# Patient Record
Sex: FEMALE | Race: WHITE | NOT HISPANIC OR LATINO | Employment: FULL TIME | URBAN - METROPOLITAN AREA
[De-identification: names, ages, dates, MRNs, and addresses within clinical notes are randomized per-mention and may not be internally consistent; named-entity substitution may affect disease eponyms.]

---

## 2017-02-10 ENCOUNTER — HOSPITAL ENCOUNTER (OUTPATIENT)
Dept: LAB | Facility: HOSPITAL | Age: 37
Setting detail: SPECIMEN
Discharge: HOME | End: 2017-02-10
Attending: INTERNAL MEDICINE | Admitting: INTERNAL MEDICINE

## 2017-02-10 LAB
ALBUMIN SERPL-MCNC: 3.5 G/DL (ref 3.5–4.8)
ALBUMIN/GLOB SERPL: 1.1 {RATIO} (ref 1–1.7)
ALP SERPL-CCNC: 40 IU/L (ref 32–91)
ALT SERPL-CCNC: 17 IU/L (ref 14–54)
ANION GAP SERPL CALC-SCNC: 10.2 MMOL/L (ref 10–20)
AST SERPL-CCNC: 16 IU/L (ref 15–41)
BILIRUB SERPL-MCNC: 1.3 MG/DL (ref 0.3–1.2)
BUN SERPL-MCNC: 8 MG/DL (ref 8–20)
BUN/CREAT SERPL: 10 (ref 5.4–26.2)
CALCIUM SERPL-MCNC: 9.1 MG/DL (ref 8.9–10.3)
CHLORIDE SERPL-SCNC: 106 MMOL/L (ref 101–111)
CONV CO2: 28 MMOL/L (ref 22–32)
CONV TOTAL PROTEIN: 6.8 G/DL (ref 6.1–7.9)
CREAT UR-MCNC: 0.8 MG/DL (ref 0.4–1)
GLOBULIN UR ELPH-MCNC: 3.3 G/DL (ref 2.5–3.8)
GLUCOSE SERPL-MCNC: 83 MG/DL (ref 65–99)
POTASSIUM SERPL-SCNC: 4.2 MMOL/L (ref 3.6–5.1)
SODIUM SERPL-SCNC: 140 MMOL/L (ref 136–144)
TSH SERPL-ACNC: 3.1 UIU/ML (ref 0.34–5.6)

## 2017-05-19 ENCOUNTER — HOSPITAL ENCOUNTER (OUTPATIENT)
Dept: LAB | Facility: HOSPITAL | Age: 37
Setting detail: SPECIMEN
Discharge: HOME | End: 2017-05-19
Attending: INTERNAL MEDICINE | Admitting: INTERNAL MEDICINE

## 2017-05-19 LAB
ALBUMIN SERPL-MCNC: 3.6 G/DL (ref 3.5–4.8)
ALBUMIN/GLOB SERPL: 1.1 {RATIO} (ref 1–1.7)
ALP SERPL-CCNC: 42 IU/L (ref 32–91)
ALT SERPL-CCNC: 20 IU/L (ref 14–54)
ANION GAP SERPL CALC-SCNC: 12.2 MMOL/L (ref 10–20)
AST SERPL-CCNC: 16 IU/L (ref 15–41)
BILIRUB SERPL-MCNC: 1.3 MG/DL (ref 0.3–1.2)
BUN SERPL-MCNC: 7 MG/DL (ref 8–20)
BUN/CREAT SERPL: 7.8 (ref 5.4–26.2)
CALCIUM SERPL-MCNC: 9 MG/DL (ref 8.9–10.3)
CHLORIDE SERPL-SCNC: 108 MMOL/L (ref 101–111)
CONV CO2: 24 MMOL/L (ref 22–32)
CONV TOTAL PROTEIN: 6.8 G/DL (ref 6.1–7.9)
CREAT UR-MCNC: 0.9 MG/DL (ref 0.4–1)
GLOBULIN UR ELPH-MCNC: 3.2 G/DL (ref 2.5–3.8)
GLUCOSE SERPL-MCNC: 81 MG/DL (ref 65–99)
POTASSIUM SERPL-SCNC: 4.2 MMOL/L (ref 3.6–5.1)
SODIUM SERPL-SCNC: 140 MMOL/L (ref 136–144)
T4 FREE SERPL-MCNC: 1.08 NG/DL (ref 0.58–1.64)
TSH SERPL-ACNC: 2.09 UIU/ML (ref 0.34–5.6)

## 2017-07-17 ENCOUNTER — HOSPITAL ENCOUNTER (OUTPATIENT)
Dept: LAB | Facility: HOSPITAL | Age: 37
Setting detail: SPECIMEN
Discharge: HOME | End: 2017-07-17
Attending: ORTHOPAEDIC SURGERY | Admitting: ORTHOPAEDIC SURGERY

## 2017-07-17 LAB
CONCENTRATED SMEAR: (no result)
CONV GRANULOCYTES, FLUID: 68 %
CONV MONOCYTES, FLUID: 28 %
CRYSTALS FLD MICRO: NORMAL
LYMPHOCYTES NFR FLD MANUAL: 4 %
SPECIMEN SOURCE: (no result)
SPECIMEN SOURCE: NORMAL
WBC # FLD MANUAL: (no result) {CELLS}/UL

## 2017-11-10 ENCOUNTER — HOSPITAL ENCOUNTER (OUTPATIENT)
Dept: LAB | Facility: HOSPITAL | Age: 37
Setting detail: SPECIMEN
Discharge: HOME | End: 2017-11-10
Attending: INTERNAL MEDICINE | Admitting: INTERNAL MEDICINE

## 2017-11-10 LAB
ALBUMIN SERPL-MCNC: 3.5 G/DL (ref 3.5–4.8)
ALBUMIN/GLOB SERPL: 1.1 {RATIO} (ref 1–1.7)
ALP SERPL-CCNC: 45 IU/L (ref 32–91)
ALT SERPL-CCNC: 14 IU/L (ref 14–54)
ANION GAP SERPL CALC-SCNC: 12.4 MMOL/L (ref 10–20)
AST SERPL-CCNC: 16 IU/L (ref 15–41)
BILIRUB SERPL-MCNC: 1.7 MG/DL (ref 0.3–1.2)
BUN SERPL-MCNC: 7 MG/DL (ref 8–20)
BUN/CREAT SERPL: 8.8 (ref 5.4–26.2)
CALCIUM SERPL-MCNC: 8.9 MG/DL (ref 8.9–10.3)
CHLORIDE SERPL-SCNC: 106 MMOL/L (ref 101–111)
CONV CO2: 24 MMOL/L (ref 22–32)
CONV TOTAL PROTEIN: 6.7 G/DL (ref 6.1–7.9)
CREAT UR-MCNC: 0.8 MG/DL (ref 0.4–1)
GLOBULIN UR ELPH-MCNC: 3.2 G/DL (ref 2.5–3.8)
GLUCOSE SERPL-MCNC: 88 MG/DL (ref 65–99)
POTASSIUM SERPL-SCNC: 4.4 MMOL/L (ref 3.6–5.1)
SODIUM SERPL-SCNC: 138 MMOL/L (ref 136–144)
T4 FREE SERPL-MCNC: 0.99 NG/DL (ref 0.58–1.64)
TSH SERPL-ACNC: 3.02 UIU/ML (ref 0.34–5.6)

## 2018-02-09 ENCOUNTER — HOSPITAL ENCOUNTER (OUTPATIENT)
Dept: LAB | Facility: HOSPITAL | Age: 38
Setting detail: SPECIMEN
Discharge: HOME | End: 2018-02-09
Attending: INTERNAL MEDICINE | Admitting: INTERNAL MEDICINE

## 2018-02-09 LAB
ALBUMIN SERPL-MCNC: 3.3 G/DL (ref 3.5–4.8)
ALBUMIN/GLOB SERPL: 0.8 {RATIO} (ref 1–1.7)
ALP SERPL-CCNC: 53 IU/L (ref 32–91)
ALT SERPL-CCNC: 22 IU/L (ref 14–54)
ANION GAP SERPL CALC-SCNC: 12.2 MMOL/L (ref 10–20)
AST SERPL-CCNC: 18 IU/L (ref 15–41)
BILIRUB SERPL-MCNC: 1 MG/DL (ref 0.3–1.2)
BUN SERPL-MCNC: 6 MG/DL (ref 8–20)
BUN/CREAT SERPL: 7.5 (ref 5.4–26.2)
CALCIUM SERPL-MCNC: 8.9 MG/DL (ref 8.9–10.3)
CHLORIDE SERPL-SCNC: 104 MMOL/L (ref 101–111)
CONV CO2: 26 MMOL/L (ref 22–32)
CONV TOTAL PROTEIN: 7.3 G/DL (ref 6.1–7.9)
CREAT UR-MCNC: 0.8 MG/DL (ref 0.4–1)
GLOBULIN UR ELPH-MCNC: 4 G/DL (ref 2.5–3.8)
GLUCOSE SERPL-MCNC: 89 MG/DL (ref 65–99)
POTASSIUM SERPL-SCNC: 4.2 MMOL/L (ref 3.6–5.1)
SODIUM SERPL-SCNC: 138 MMOL/L (ref 136–144)

## 2018-08-10 ENCOUNTER — HOSPITAL ENCOUNTER (OUTPATIENT)
Dept: LAB | Facility: HOSPITAL | Age: 38
Setting detail: SPECIMEN
Discharge: HOME | End: 2018-08-10
Attending: INTERNAL MEDICINE | Admitting: INTERNAL MEDICINE

## 2018-08-10 LAB
ALBUMIN SERPL-MCNC: 3.6 G/DL (ref 3.5–4.8)
ALBUMIN/GLOB SERPL: 1.1 {RATIO} (ref 1–1.7)
ALP SERPL-CCNC: 41 IU/L (ref 32–91)
ALT SERPL-CCNC: 13 IU/L (ref 14–54)
ANION GAP SERPL CALC-SCNC: 10.1 MMOL/L (ref 10–20)
AST SERPL-CCNC: 13 IU/L (ref 15–41)
BILIRUB SERPL-MCNC: 1.8 MG/DL (ref 0.3–1.2)
BUN SERPL-MCNC: 9 MG/DL (ref 8–20)
BUN/CREAT SERPL: 10 (ref 5.4–26.2)
CALCIUM SERPL-MCNC: 8.9 MG/DL (ref 8.9–10.3)
CHLORIDE SERPL-SCNC: 108 MMOL/L (ref 101–111)
CONV CO2: 26 MMOL/L (ref 22–32)
CONV TOTAL PROTEIN: 6.8 G/DL (ref 6.1–7.9)
CREAT UR-MCNC: 0.9 MG/DL (ref 0.4–1)
GLOBULIN UR ELPH-MCNC: 3.2 G/DL (ref 2.5–3.8)
GLUCOSE SERPL-MCNC: 101 MG/DL (ref 65–99)
POTASSIUM SERPL-SCNC: 4.1 MMOL/L (ref 3.6–5.1)
SODIUM SERPL-SCNC: 140 MMOL/L (ref 136–144)

## 2018-10-15 ENCOUNTER — HOSPITAL ENCOUNTER (OUTPATIENT)
Dept: LAB | Facility: HOSPITAL | Age: 38
Setting detail: SPECIMEN
Discharge: HOME | End: 2018-10-15
Attending: ORTHOPAEDIC SURGERY | Admitting: ORTHOPAEDIC SURGERY

## 2018-10-15 LAB
CRYSTALS FLD MICRO: NORMAL
FLUID TYPE (REF): NORMAL

## 2019-02-08 ENCOUNTER — HOSPITAL ENCOUNTER (OUTPATIENT)
Dept: LAB | Facility: HOSPITAL | Age: 39
Setting detail: SPECIMEN
Discharge: HOME | End: 2019-02-08
Attending: INTERNAL MEDICINE | Admitting: INTERNAL MEDICINE

## 2019-02-08 LAB
ALBUMIN SERPL-MCNC: 3.6 G/DL (ref 3.5–4.8)
ALBUMIN/GLOB SERPL: 1.2 {RATIO} (ref 1–1.7)
ALP SERPL-CCNC: 44 IU/L (ref 32–91)
ALT SERPL-CCNC: 16 IU/L (ref 14–54)
ANION GAP SERPL CALC-SCNC: 12.9 MMOL/L (ref 10–20)
AST SERPL-CCNC: 15 IU/L (ref 15–41)
BILIRUB SERPL-MCNC: 1.2 MG/DL (ref 0.3–1.2)
BUN SERPL-MCNC: 6 MG/DL (ref 8–20)
BUN/CREAT SERPL: 7.5 (ref 5.4–26.2)
CALCIUM SERPL-MCNC: 8.8 MG/DL (ref 8.9–10.3)
CHLORIDE SERPL-SCNC: 107 MMOL/L (ref 101–111)
CONV CO2: 23 MMOL/L (ref 22–32)
CONV TOTAL PROTEIN: 6.6 G/DL (ref 6.1–7.9)
CREAT UR-MCNC: 0.8 MG/DL (ref 0.4–1)
GLOBULIN UR ELPH-MCNC: 3 G/DL (ref 2.5–3.8)
GLUCOSE SERPL-MCNC: 88 MG/DL (ref 65–99)
POTASSIUM SERPL-SCNC: 3.9 MMOL/L (ref 3.6–5.1)
SODIUM SERPL-SCNC: 139 MMOL/L (ref 136–144)

## 2019-02-14 ENCOUNTER — HOSPITAL ENCOUNTER (OUTPATIENT)
Dept: ORTHOPEDIC SURGERY | Facility: CLINIC | Age: 39
Discharge: HOME | End: 2019-02-14
Attending: PODIATRIST | Admitting: PODIATRIST

## 2019-03-01 ENCOUNTER — HOSPITAL ENCOUNTER (OUTPATIENT)
Dept: PHYSICAL THERAPY | Facility: HOSPITAL | Age: 39
Setting detail: RECURRING SERIES
Discharge: HOME | End: 2019-04-04
Attending: PODIATRIST | Admitting: PODIATRIST

## 2019-06-24 ENCOUNTER — OFFICE VISIT (OUTPATIENT)
Dept: FAMILY MEDICINE CLINIC | Facility: CLINIC | Age: 39
End: 2019-06-24

## 2019-06-24 VITALS
WEIGHT: 276 LBS | HEIGHT: 65 IN | RESPIRATION RATE: 16 BRPM | SYSTOLIC BLOOD PRESSURE: 128 MMHG | BODY MASS INDEX: 45.98 KG/M2 | TEMPERATURE: 99 F | DIASTOLIC BLOOD PRESSURE: 88 MMHG | HEART RATE: 98 BPM

## 2019-06-24 DIAGNOSIS — J02.9 ACUTE PHARYNGITIS, UNSPECIFIED ETIOLOGY: Primary | ICD-10-CM

## 2019-06-24 PROCEDURE — 99213 OFFICE O/P EST LOW 20 MIN: CPT | Performed by: FAMILY MEDICINE

## 2019-06-24 RX ORDER — CEPHALEXIN 500 MG/1
500 CAPSULE ORAL 3 TIMES DAILY
Qty: 30 CAPSULE | Refills: 0 | Status: SHIPPED | OUTPATIENT
Start: 2019-06-24 | End: 2019-08-23 | Stop reason: ALTCHOICE

## 2019-06-24 RX ORDER — LEVOTHYROXINE SODIUM 0.05 MG/1
1 TABLET ORAL EVERY 24 HOURS
COMMUNITY
Start: 2018-10-11 | End: 2019-08-23 | Stop reason: SDUPTHER

## 2019-06-24 RX ORDER — LOSARTAN POTASSIUM 25 MG/1
1 TABLET ORAL EVERY 24 HOURS
COMMUNITY
Start: 2018-06-26 | End: 2020-02-25 | Stop reason: SDUPTHER

## 2019-06-24 NOTE — PATIENT INSTRUCTIONS
"Upper Respiratory Infection, Adult  An upper respiratory infection (URI) affects the nose, throat, and upper air passages. URIs are caused by germs (viruses). The most common type of URI is often called \"the common cold.\"  Medicines cannot cure URIs, but you can do things at home to relieve your symptoms. URIs usually get better within 7-10 days.  Follow these instructions at home:  Activity  · Rest as needed.  · If you have a fever, stay home from work or school until your fever is gone, or until your doctor says you may return to work or school.  ? You should stay home until you cannot spread the infection anymore (you are not contagious).  ? Your doctor may have you wear a face mask so you have less risk of spreading the infection.  Relieving symptoms  · Gargle with a salt-water mixture 3-4 times a day or as needed. To make a salt-water mixture, completely dissolve ½-1 tsp of salt in 1 cup of warm water.  · Use a cool-mist humidifier to add moisture to the air. This can help you breathe more easily.  Eating and drinking  · Drink enough fluid to keep your pee (urine) pale yellow.  · Eat soups and other clear broths.  General instructions  · Take over-the-counter and prescription medicines only as told by your doctor. These include cold medicines, fever reducers, and cough suppressants.  · Do not use any products that contain nicotine or tobacco. These include cigarettes and e-cigarettes. If you need help quitting, ask your doctor.  · Avoid being where people are smoking (avoid secondhand smoke).  · Make sure you get regular shots and get the flu shot every year.  · Keep all follow-up visits as told by your doctor. This is important.  How to avoid spreading infection to others  · Wash your hands often with soap and water. If you do not have soap and water, use hand .  · Avoid touching your mouth, face, eyes, or nose.  · Cough or sneeze into a tissue or your sleeve or elbow. Do not cough or sneeze into your " "hand or into the air.  Contact a doctor if:  · You are getting worse, not better.  · You have any of these:  ? A fever.  ? Chills.  ? Brown or red mucus in your nose.  ? Yellow or brown fluid (discharge)coming from your nose.  ? Pain in your face, especially when you bend forward.  ? Swollen neck glands.  ? Pain with swallowing.  ? White areas in the back of your throat.  Get help right away if:  · You have shortness of breath that gets worse.  · You have very bad or constant:  ? Headache.  ? Ear pain.  ? Pain in your forehead, behind your eyes, and over your cheekbones (sinus pain).  ? Chest pain.  · You have long-lasting (chronic) lung disease along with any of these:  ? Wheezing.  ? Long-lasting cough.  ? Coughing up blood.  ? A change in your usual mucus.  · You have a stiff neck.  · You have changes in your:  ? Vision.  ? Hearing.  ? Thinking.  ? Mood.  Summary  · An upper respiratory infection (URI) is caused by a germ called a virus. The most common type of URI is often called \"the common cold.\"  · URIs usually get better within 7-10 days.  · Take over-the-counter and prescription medicines only as told by your doctor.  This information is not intended to replace advice given to you by your health care provider. Make sure you discuss any questions you have with your health care provider.  Document Released: 06/05/2009 Document Revised: 08/10/2018 Document Reviewed: 08/10/2018  [employer] Interactive Patient Education © 2019 [employer]    "

## 2019-06-24 NOTE — PROGRESS NOTES
Sore Throat    This is a new problem. The current episode started yesterday. The problem has been gradually worsening. Neither side of throat is experiencing more pain than the other. The maximum temperature recorded prior to her arrival was 100.4 - 100.9 F. The fever has been present for less than 1 day. The pain is severe. Associated symptoms include a hoarse voice, swollen glands and trouble swallowing. Pertinent negatives include no abdominal pain, congestion, coughing, diarrhea, drooling, ear discharge, ear pain, headaches, neck pain, shortness of breath, stridor or vomiting. She has had no exposure to strep or mono. She has tried cool liquids, acetaminophen and gargles for the symptoms. The treatment provided no relief.     Past Medical History:   Diagnosis Date   • Anxiety    • Cornea ulcer    • Female hirsutism    • H/O Hashimoto thyroiditis    • Hypertension    • IUD (intrauterine device) in place     presence of mirena IUD   • Kidney stone 2014   • Morbid obesity (CMS/HCC)    • Polycystic ovarian syndrome    • Vitamin D deficiency      Past Surgical History:   Procedure Laterality Date   •  SECTION       X 2 2007, 2005   • KNEE ARTHROSCOPY  2012     X 2   • LAPAROSCOPIC CHOLECYSTECTOMY     • LAPAROSCOPIC GASTRIC BANDING  2011     Family History   Problem Relation Age of Onset   • Heart disease Mother    • Stroke Mother          of cerebral hemorrhage on Plavix, aspirin, and coumadin)   • Hypertension Mother    • Heart disease Father    • Diabetes Father    • Cancer Maternal Grandmother         colon cancer   • Heart disease Paternal Grandfather    • Cancer Other         aunt - breast cancer     Social History     Tobacco Use   • Smoking status: Never Smoker   • Smokeless tobacco: Never Used   Substance Use Topics   • Alcohol use: Yes     Review of Systems   HENT: Positive for hoarse voice, sore throat and trouble swallowing. Negative for congestion, drooling, ear discharge and ear  pain.    Respiratory: Negative for cough, shortness of breath and stridor.    Gastrointestinal: Negative for abdominal pain, diarrhea and vomiting.   Musculoskeletal: Negative for neck pain.     Physical Exam   Constitutional: She appears well-developed and well-nourished. No distress.   HENT:   Head: Normocephalic and atraumatic.   Right Ear: Hearing, tympanic membrane, external ear and ear canal normal.   Left Ear: Hearing, tympanic membrane, external ear and ear canal normal.   Nose: Nose normal.   Mouth/Throat: Uvula is midline, oropharynx is clear and moist and mucous membranes are normal. Oral lesions present. Tonsils are 0 on the right. Tonsils are 0 on the left. No tonsillar exudate.    Small mucosal ulcers with hyperemia noted   Pulmonary/Chest: Effort normal and breath sounds normal.     No visits with results within 7 Day(s) from this visit.   Latest known visit with results is:   Hospital Outpatient Visit on 02/08/2019   Component Date Value Ref Range Status   • Sodium 02/08/2019 139  136 - 144 mmol/L Final   • Potassium 02/08/2019 3.9  3.6 - 5.1 mmol/L Final   • Chloride 02/08/2019 107  101 - 111 mmol/L Final   • CO2 02/08/2019 23  22 - 32 mmol/L Final   • Glucose 02/08/2019 88  65 - 99 mg/dL Final   • BUN 02/08/2019 6* 8 - 20 mg/dL Final   • Creatinine 02/08/2019 0.8  0.4 - 1.0 mg/dl Final   • Calcium 02/08/2019 8.8* 8.9 - 10.3 mg/dL Final   • Total Protein 02/08/2019 6.6  6.1 - 7.9 g/dL Final   • Albumin 02/08/2019 3.6  3.5 - 4.8 g/dL Final   • Total Bilirubin 02/08/2019 1.2  0.3 - 1.2 mg/dL Final   • Alkaline Phosphatase 02/08/2019 44  32 - 91 IU/L Final   • AST (SGOT) 02/08/2019 15  15 - 41 IU/L Final   • ALT (SGPT) 02/08/2019 16  14 - 54 IU/L Final   • Anion Gap 02/08/2019 12.9  10 - 20 Final   • BUN/Creatinine Ratio 02/08/2019 7.5  5.4 - 26.2 Final   • GFR MDRD Non  02/08/2019 >60  >60 mL/min/1.73m2 Final   • GFR MDRD  02/08/2019 >60  >60 mL/min/1.73m2 Final   •  Globulin 02/08/2019 3.0  2.5 - 3.8 G/dL Final   • A/G Ratio 02/08/2019 1.2  1.0 - 1.7 Final         Diagnoses and all orders for this visit:    1. Acute pharyngitis, unspecified etiology (Primary)  Comments:   most likely viral.  Could be streptococcal.  Start antibiotics and follow up in 10-14 days if no better.    Other orders  -     cephalexin (KEFLEX) 500 MG capsule; Take 1 capsule by mouth 3 (Three) Times a Day.  Dispense: 30 capsule; Refill: 0    Increase fluids. Tylenol and Advil prn. Report worsening or persistence of symptoms.  Discussed medications to help with symptoms of upper respiratory infection including cough suppressants, decongestants, anti-inflammatory medications, antihistamines, and antipyretics.  Patient was given a list of medications with appropriate dosages.  Complete course of medications. Adverse effects discussed. Consider use of probiotics to limit GI side-effects.

## 2019-07-01 ENCOUNTER — OFFICE VISIT (OUTPATIENT)
Dept: PODIATRY | Facility: CLINIC | Age: 39
End: 2019-07-01

## 2019-07-01 VITALS
WEIGHT: 279 LBS | HEIGHT: 64 IN | SYSTOLIC BLOOD PRESSURE: 118 MMHG | BODY MASS INDEX: 47.63 KG/M2 | DIASTOLIC BLOOD PRESSURE: 83 MMHG | HEART RATE: 91 BPM

## 2019-07-01 DIAGNOSIS — M77.31 CALCANEAL SPUR OF RIGHT FOOT: Primary | ICD-10-CM

## 2019-07-01 DIAGNOSIS — M76.61 ACHILLES TENDINITIS, RIGHT LEG: ICD-10-CM

## 2019-07-01 PROCEDURE — 20551 NJX 1 TENDON ORIGIN/INSJ: CPT | Performed by: PODIATRIST

## 2019-07-01 PROCEDURE — 99213 OFFICE O/P EST LOW 20 MIN: CPT | Performed by: PODIATRIST

## 2019-07-01 RX ORDER — TRIAMCINOLONE ACETONIDE 40 MG/ML
40 INJECTION, SUSPENSION INTRA-ARTICULAR; INTRAMUSCULAR ONCE
Status: COMPLETED | OUTPATIENT
Start: 2019-07-01 | End: 2019-07-01

## 2019-07-01 RX ADMIN — TRIAMCINOLONE ACETONIDE 40 MG: 40 INJECTION, SUSPENSION INTRA-ARTICULAR; INTRAMUSCULAR at 08:55

## 2019-07-01 NOTE — PROGRESS NOTES
"07/01/2019  Foot and Ankle Surgery - Established Patient/Follow-up  Provider: Dr. Cisco Victor DPM  Location: [facility]    Subjective:  Tiara Dewitt is a 39 y.o. female.     Chief Complaint   Patient presents with   • Right Ankle - Follow-up       HPI: Patient returns with increased discomfort affecting the posterior aspect of the right heel.  She states that she did quite well after the previous steroid injection.  She did notice long-term relief.  Recently symptoms have been increasing.  She would like to repeat the steroid injection today.    No Known Allergies    Current Outpatient Medications on File Prior to Visit   Medication Sig Dispense Refill   • cephalexin (KEFLEX) 500 MG capsule Take 1 capsule by mouth 3 (Three) Times a Day. 30 capsule 0   • Cholecalciferol (VITAMIN D3) 33468 units capsule Take 1 capsule by mouth Every 7 (Seven) Days.     • levothyroxine (SYNTHROID, LEVOTHROID) 50 MCG tablet Take 1 tablet by mouth Daily.     • losartan (COZAAR) 25 MG tablet Take 1 tablet by mouth Daily.     • metFORMIN (GLUCOPHAGE) 500 MG tablet Take 1 tablet by mouth Daily.       No current facility-administered medications on file prior to visit.        Objective   /83 (BP Location: Right arm, Patient Position: Sitting, Cuff Size: Large Adult)   Pulse 91   Ht 162.6 cm (64\")   Wt 127 kg (279 lb)   BMI 47.89 kg/m²     Podiatry Exam       General Appearance:   obese; no apparent distress  Mental Status Exam        Judgement and Insight:  Intact       Orientation:  Oriented to time, place, and person  Cardiovascular (Right)       Dorsalis Pedis Pulse (Rt):  2/4       Posterior Tibialis Pulse (Rt):  2/4       Capillary Filling Time (Rt):  1-3 Seconds       Edema (Rt):  No Edema  Dermatological Exam       Temperature:  warm to warm       Skin Elasticity:  normal skin elasticity  Neurological Exam (Right)       Paresthesia (Rt):  negative       Achilles DTRs (Rt):  symmetric       Tinel over Tarsal " Tunnel (Rt):  negative        MusculoSkeletal Exam (Right)       Gait and Stance (Rt):   early heel off.  Mildly antalgic       ROM (Rt):   ankle and pedal joint range of motion is supple, nontender crepitus free       Muscle Strength (Rt):  symmetrical 5/5       Subluxed Digits (Rt):  no subluxation or laxity of joints       Dislocated Joints (Rt):  no dislocation of joints       Gastroc soleus equinus (Rt):  Inability to dorsiflex past neutral position both straight legged and bent knee       Post tibial tendon (Rt):  no soreness noted       Peroneal Tendon (Rt):  no soreness noted  Additional Musculoskelatal Findings   pain with palpation to the posterior superior lateral aspect of the calcaneus.  Discomfort to the Achilles tendon.  No defect or hypertrophy.  No other gross deformity or instability       Assessment/Plan     Tiara was seen today for follow-up.    Diagnoses and all orders for this visit:    Calcaneal spur of right foot    Achilles tendinitis, right leg  -     triamcinolone acetonide (KENALOG-40) injection 40 mg    Patient would like to repeat the steroid injection today.  She is unwilling to consider surgical options.  We did review the benefits and potential risks including Achilles tendon tear/rupture.  Procedure was performed without complication.  I have asked that she decrease her overall activity level over the next week.  She may gradually return to baseline activity thereafter.  Would like to see her in 3 months for reevaluation.     Retrocalcaneal/ Achilles Steroid Injection: Right    Informed consent was obtained before proceeding with injection.  The skin about the lateral Kagar's triangle region of the right foot was cleansed with alcohol.  Using an aseptic technique, a 1.5 mL solution containing 0.5 mL of 0.5% Marcaine plain, 0.5mL of 1% lidocaine plain and 0.5 mL of Kenalog was injected to the insertion site of the Achilles tendon. After the injection, compression was applied followed  by a sterile bandage.  The patient noted relief from pain and tolerated the injection well without complication.      No orders of the defined types were placed in this encounter.           Note is dictated utilizing voice recognition software. Unfortunately this leads to occasional typographical errors. I apologize in advance if the situation occurs. If questions occur please do not hesitate to call our office.

## 2019-08-12 DIAGNOSIS — E55.9 VITAMIN D DEFICIENCY: Primary | ICD-10-CM

## 2019-08-12 DIAGNOSIS — E06.3 HASHIMOTO'S THYROIDITIS: ICD-10-CM

## 2019-08-12 DIAGNOSIS — E28.2 POLYCYSTIC OVARIAN DISEASE: ICD-10-CM

## 2019-08-12 DIAGNOSIS — I10 BENIGN HYPERTENSION: ICD-10-CM

## 2019-08-12 PROBLEM — M77.31 CALCANEAL SPUR OF RIGHT FOOT: Status: ACTIVE | Noted: 2019-02-14

## 2019-08-12 PROBLEM — L03.811 CELLULITIS OF SCALP: Status: ACTIVE | Noted: 2018-04-27

## 2019-08-12 PROBLEM — M79.671 FOOT PAIN, RIGHT: Status: ACTIVE | Noted: 2019-02-14

## 2019-08-12 PROBLEM — E66.3 OVERWEIGHT: Status: ACTIVE | Noted: 2018-01-22

## 2019-08-12 PROBLEM — E66.01 MORBID OBESITY DUE TO EXCESS CALORIES (HCC): Status: ACTIVE | Noted: 2017-10-30

## 2019-08-12 PROBLEM — F41.9 ANXIETY: Status: ACTIVE | Noted: 2018-01-22

## 2019-08-12 PROBLEM — B37.31 CANDIDIASIS OF VAGINA: Status: ACTIVE | Noted: 2018-04-27

## 2019-08-12 PROBLEM — M76.61 ACHILLES TENDINITIS OF RIGHT LOWER EXTREMITY: Status: ACTIVE | Noted: 2019-02-14

## 2019-08-16 ENCOUNTER — LAB (OUTPATIENT)
Dept: LAB | Facility: HOSPITAL | Age: 39
End: 2019-08-16

## 2019-08-16 DIAGNOSIS — E55.9 VITAMIN D DEFICIENCY: ICD-10-CM

## 2019-08-16 DIAGNOSIS — I10 BENIGN HYPERTENSION: ICD-10-CM

## 2019-08-16 DIAGNOSIS — E28.2 POLYCYSTIC OVARIAN DISEASE: ICD-10-CM

## 2019-08-16 DIAGNOSIS — E06.3 HASHIMOTO'S THYROIDITIS: ICD-10-CM

## 2019-08-16 LAB
25(OH)D3 SERPL-MCNC: 18.4 NG/ML (ref 30–100)
ALBUMIN SERPL-MCNC: 3.4 G/DL (ref 3.5–4.8)
ALBUMIN/GLOB SERPL: 1.2 G/DL (ref 1–1.7)
ALP SERPL-CCNC: 43 U/L (ref 32–91)
ALT SERPL W P-5'-P-CCNC: 14 U/L (ref 14–54)
ANION GAP SERPL CALCULATED.3IONS-SCNC: 12.1 MMOL/L (ref 5–15)
AST SERPL-CCNC: 15 U/L (ref 15–41)
BILIRUB SERPL-MCNC: 2.1 MG/DL (ref 0.3–1.2)
BUN BLD-MCNC: 6 MG/DL (ref 8–20)
BUN/CREAT SERPL: 6.7 (ref 5.4–26.2)
CALCIUM SPEC-SCNC: 8.6 MG/DL (ref 8.9–10.3)
CHLORIDE SERPL-SCNC: 104 MMOL/L (ref 101–111)
CO2 SERPL-SCNC: 25 MMOL/L (ref 22–32)
CREAT BLD-MCNC: 0.9 MG/DL (ref 0.4–1)
GFR SERPL CREATININE-BSD FRML MDRD: 69 ML/MIN/1.73
GLOBULIN UR ELPH-MCNC: 2.9 GM/DL (ref 2.5–3.8)
GLUCOSE BLD-MCNC: 80 MG/DL (ref 65–99)
HBA1C MFR BLD: 4.9 % (ref 3.5–5.6)
POTASSIUM BLD-SCNC: 4.1 MMOL/L (ref 3.6–5.1)
PROT SERPL-MCNC: 6.3 G/DL (ref 6.1–7.9)
SODIUM BLD-SCNC: 137 MMOL/L (ref 136–144)
T4 FREE SERPL-MCNC: 0.98 NG/DL (ref 0.58–1.64)
TSH SERPL DL<=0.05 MIU/L-ACNC: 2.25 MIU/ML (ref 0.34–5.6)

## 2019-08-16 PROCEDURE — 36415 COLL VENOUS BLD VENIPUNCTURE: CPT

## 2019-08-16 PROCEDURE — 84439 ASSAY OF FREE THYROXINE: CPT | Performed by: INTERNAL MEDICINE

## 2019-08-16 PROCEDURE — 83036 HEMOGLOBIN GLYCOSYLATED A1C: CPT | Performed by: INTERNAL MEDICINE

## 2019-08-16 PROCEDURE — 84443 ASSAY THYROID STIM HORMONE: CPT | Performed by: INTERNAL MEDICINE

## 2019-08-16 PROCEDURE — 82306 VITAMIN D 25 HYDROXY: CPT | Performed by: INTERNAL MEDICINE

## 2019-08-16 PROCEDURE — 80053 COMPREHEN METABOLIC PANEL: CPT | Performed by: INTERNAL MEDICINE

## 2019-08-23 ENCOUNTER — OFFICE VISIT (OUTPATIENT)
Dept: ENDOCRINOLOGY | Facility: CLINIC | Age: 39
End: 2019-08-23

## 2019-08-23 VITALS
WEIGHT: 279 LBS | SYSTOLIC BLOOD PRESSURE: 120 MMHG | BODY MASS INDEX: 47.63 KG/M2 | DIASTOLIC BLOOD PRESSURE: 70 MMHG | HEART RATE: 77 BPM | HEIGHT: 64 IN | OXYGEN SATURATION: 98 %

## 2019-08-23 DIAGNOSIS — E55.9 VITAMIN D DEFICIENCY: ICD-10-CM

## 2019-08-23 DIAGNOSIS — E28.2 POLYCYSTIC OVARIAN DISEASE: ICD-10-CM

## 2019-08-23 DIAGNOSIS — E06.3 HYPOTHYROIDISM DUE TO HASHIMOTO'S THYROIDITIS: Primary | ICD-10-CM

## 2019-08-23 DIAGNOSIS — E03.8 HYPOTHYROIDISM DUE TO HASHIMOTO'S THYROIDITIS: Primary | ICD-10-CM

## 2019-08-23 DIAGNOSIS — E66.01 MORBID OBESITY DUE TO EXCESS CALORIES (HCC): ICD-10-CM

## 2019-08-23 PROCEDURE — 99214 OFFICE O/P EST MOD 30 MIN: CPT | Performed by: INTERNAL MEDICINE

## 2019-08-23 RX ORDER — LEVOTHYROXINE SODIUM 0.05 MG/1
50 TABLET ORAL EVERY 24 HOURS
Qty: 90 TABLET | Refills: 4 | Status: SHIPPED | OUTPATIENT
Start: 2019-08-23 | End: 2020-10-22

## 2019-08-23 NOTE — PROGRESS NOTES
Endocrine Progress Note Outpatient     Patient Care Team:  Lyell, Reggie Duane, MD as PCP - General  Lyell, Reggie Duane, MD as PCP - Family Medicine    Chief Complaint: Follow-up hypothyroidism    HPI: 39-year-old female with history of hypothyroidism, PCOS, vitamin D deficiency and obesity is here for follow-up.  For hypothyroidism: She is currently on levothyroxine 50 mcg p.o. daily.  She tells me that she is taking it on regular basis.  She does feel some tiredness, complaining of dry skin, hair loss and constipation.  Vitamin D deficiency: She is taking vitamin D but not sure of the dose.  PCOS: She has a stopped Spironolactone she is a status post lap band, she started having some hiccups and she is in process of removing the LAP-BAND.  He is working on her diet and activity.  She is currently taking metformin.    Past Medical History:   Diagnosis Date   • Anxiety    • Bone spur    • Cornea ulcer    • Female hirsutism    • H/O Hashimoto thyroiditis    • Hypertension    • IUD (intrauterine device) in place     presence of mirena IUD   • Kidney stone 2014   • Morbid obesity (CMS/HCC)    • Polycystic ovarian syndrome    • Pseudogout    • Vitamin D deficiency        Social History     Socioeconomic History   • Marital status:      Spouse name: Not on file   • Number of children: Not on file   • Years of education: Not on file   • Highest education level: Not on file   Tobacco Use   • Smoking status: Never Smoker   • Smokeless tobacco: Never Used   Substance and Sexual Activity   • Alcohol use: Yes   • Drug use: No       Family History   Problem Relation Age of Onset   • Heart disease Mother    • Stroke Mother          of cerebral hemorrhage on Plavix, aspirin, and coumadin)   • Hypertension Mother    • Heart disease Father    • Diabetes Father    • Cancer Maternal Grandmother         colon cancer   • Heart disease Paternal Grandfather    • Cancer Other         aunt - breast cancer       No Known  Allergies    ROS:   Constitutional:  Admit fatigue, tiredness.    Eyes:  Denies change in visual acuity   HENT:  Denies nasal congestion or sore throat   Respiratory: denies cough, shortness of breath.   Cardiovascular:  denies chest pain, edema   GI:  Denies abdominal pain, nausea, vomiting.   Musculoskeletal:  Denies back pain or joint pain   Integument:  Admit dry skin and rash   Neurologic:  Denies headache, focal weakness or sensory changes   Endocrine:  Denies polyuria or polydipsia   Psychiatric:  Denies depression, admit anxiety      Vitals:    08/23/19 0837   BP: 120/70   Pulse: 77   SpO2: 98%       Physical Exam:  GEN: NAD, conversant, Obese  EYES: EOMI, PERRL, no conjunctival erythema  NECK: no thyromegaly, full ROM   CV: RRR, no murmurs/rubs/gallops, no peripheral edema  LUNG: CTAB, no wheezes/rales/ronchi  SKIN: no rashes, no acanthosis  MSK: no deformities, full ROM of all extremities  NEURO: no tremors, DTR normal  PSYCH: AOX3, appropriate mood, affect normal      Results Review:     I reviewed the patient's new clinical results.    Lab Results   Component Value Date    HGBA1C 4.9 08/16/2019      Lab Results   Component Value Date    GLUCOSE 80 08/16/2019    BUN 6 (L) 08/16/2019    CREATININE 0.90 08/16/2019    EGFRIFNONA 69 08/16/2019    EGFRIFAFRI 115 01/20/2017    BCR 6.7 08/16/2019    K 4.1 08/16/2019    CO2 25.0 08/16/2019    CALCIUM 8.6 (L) 08/16/2019    ALBUMIN 3.40 (L) 08/16/2019    LABIL2 1.2 02/08/2019    AST 15 08/16/2019    ALT 14 08/16/2019     Lab Results   Component Value Date    TSH 2.250 08/16/2019    FREET4 0.98 08/16/2019         Medication Review: Reviewed.       Current Outpatient Medications:   •  Cholecalciferol (VITAMIN D3) 1000 units capsule, Take 1 capsule by mouth Every 7 (Seven) Days., Disp: , Rfl:   •  levothyroxine (SYNTHROID, LEVOTHROID) 50 MCG tablet, Take 1 tablet by mouth Daily., Disp: , Rfl:   •  losartan (COZAAR) 25 MG tablet, Take 1 tablet by mouth Daily., Disp: ,  "Rfl:   •  metFORMIN (GLUCOPHAGE) 500 MG tablet, Take 1 tablet by mouth Daily., Disp: , Rfl:   •  cephalexin (KEFLEX) 500 MG capsule, Take 1 capsule by mouth 3 (Three) Times a Day., Disp: 30 capsule, Rfl: 0      Assessment/Plan   Hypothyroidism: Well-controlled with TSH of 2.25.  We will continue levothyroxine 50 mcg p.o. daily.  Vitamin D deficiency: She needs to take vitamin D over-the-counter 5000 units p.o. daily.  PCOS: She does have IUD, she does not want to plan for pregnancy.  She is on metformin, I asked her to increase metformin to 500 mg twice a day and if she has symptoms like hirsutism or acne that she can consider spironolactone.  Obesity: She is working on her diet and activity, she is in process to remove lap band because of the hiccups.            Adela Duncan MD FACE.  06/15/19  4:34 PM      EMR Dragon / transcription disclaimer:     \"Dictated utilizing Dragon dictation\".                 "

## 2019-08-23 NOTE — PATIENT INSTRUCTIONS
Increase metformin to 500 mg twice a day  Please take vitamin D 5000 units p.o. daily over-the-counter  Please follow-up in 6 months with labs.

## 2019-10-02 ENCOUNTER — OFFICE VISIT (OUTPATIENT)
Dept: PODIATRY | Facility: CLINIC | Age: 39
End: 2019-10-02

## 2019-10-02 VITALS
HEIGHT: 64 IN | WEIGHT: 282 LBS | BODY MASS INDEX: 48.14 KG/M2 | DIASTOLIC BLOOD PRESSURE: 101 MMHG | SYSTOLIC BLOOD PRESSURE: 156 MMHG | HEART RATE: 67 BPM

## 2019-10-02 DIAGNOSIS — M76.61 ACHILLES TENDINITIS, RIGHT LEG: ICD-10-CM

## 2019-10-02 DIAGNOSIS — M77.31 CALCANEAL SPUR OF RIGHT FOOT: Primary | ICD-10-CM

## 2019-10-02 PROCEDURE — 99213 OFFICE O/P EST LOW 20 MIN: CPT | Performed by: PODIATRIST

## 2019-10-02 NOTE — PROGRESS NOTES
"10/02/2019  Foot and Ankle Surgery - Established Patient/Follow-up  Provider: Dr. Cisco Victor DPM  Location: [facility]    Subjective:  Tiara Dewitt is a 39 y.o. female.     Chief Complaint   Patient presents with   • Right Ankle - Follow-up, Pain       HPI: Patient returns with continued discomfort involving the posterior aspect of the right heel.  She noticed no significant improvement after the previous steroid injection.  She continues to have pain and limitation with daily activities and would like to discuss further options today.    No Known Allergies    Current Outpatient Medications on File Prior to Visit   Medication Sig Dispense Refill   • Cholecalciferol (VITAMIN D3) 1000 units capsule Take 1 capsule by mouth Every 7 (Seven) Days.     • levothyroxine (SYNTHROID, LEVOTHROID) 50 MCG tablet Take 1 tablet by mouth Daily. 90 tablet 4   • losartan (COZAAR) 25 MG tablet Take 1 tablet by mouth Daily.     • metFORMIN (GLUCOPHAGE) 500 MG tablet Take 1 tablet by mouth 2 (Two) Times a Day With Meals. 180 tablet 3     No current facility-administered medications on file prior to visit.        Objective   BP (!) 156/101   Pulse 67   Ht 162.6 cm (64\")   Wt 128 kg (282 lb)   BMI 48.41 kg/m²     Podiatry Exam       General Appearance:   obese; no apparent distress  Mental Status Exam        Judgement and Insight:  Intact       Orientation:  Oriented to time, place, and person  Cardiovascular (Right)       Dorsalis Pedis Pulse (Rt):  2/4       Posterior Tibialis Pulse (Rt):  2/4       Capillary Filling Time (Rt):  1-3 Seconds       Edema (Rt):  No Edema  Dermatological Exam       Temperature:  warm to warm       Skin Elasticity:  normal skin elasticity  Neurological Exam (Right)       Paresthesia (Rt):  negative       Achilles DTRs (Rt):  symmetric       Tinel over Tarsal Tunnel (Rt):  negative        MusculoSkeletal Exam (Right)       Gait and Stance (Rt):   early heel off.  Mildly antalgic       ROM " (Rt):   ankle and pedal joint range of motion is supple, nontender crepitus free       Muscle Strength (Rt):  symmetrical 5/5       Subluxed Digits (Rt):  no subluxation or laxity of joints       Dislocated Joints (Rt):  no dislocation of joints       Gastroc soleus equinus (Rt):  Inability to dorsiflex past neutral position both straight legged and bent knee       Post tibial tendon (Rt):  no soreness noted       Peroneal Tendon (Rt):  no soreness noted  Additional Musculoskelatal Findings   pain with palpation to the posterior superior lateral aspect of the calcaneus.  Discomfort to the Achilles tendon.  No defect or hypertrophy.  No other gross deformity or instability    Assessment/Plan   Tiara was seen today for follow-up and pain.    Diagnoses and all orders for this visit:    Calcaneal spur of right foot    Achilles tendinitis, right leg      Patient continues to have significant pain with palpation to the posterior aspect of the right heel.  Her symptoms are relatively unchanged as compared to previous assessment.  She has not responded to conservative care.  Multiple modalities have been performed without improvement.  We did review further treatment options including calcaneal spur resection, Achilles tendon repair, and PRP injection.  We did review the procedures, risks, goals, and recovery at length.  She does understand that she will require off weightbearing after the surgery.  She states that she is tired of dealing with these issues and would like to proceed with surgery in the near future.  I have asked that she call with any additional questions or concerns.      No orders of the defined types were placed in this encounter.         Note is dictated utilizing voice recognition software. Unfortunately this leads to occasional typographical errors. I apologize in advance if the situation occurs. If questions occur please do not hesitate to call our office.

## 2020-02-14 ENCOUNTER — LAB (OUTPATIENT)
Dept: LAB | Facility: HOSPITAL | Age: 40
End: 2020-02-14

## 2020-02-14 DIAGNOSIS — E03.8 HYPOTHYROIDISM DUE TO HASHIMOTO'S THYROIDITIS: ICD-10-CM

## 2020-02-14 DIAGNOSIS — E66.01 MORBID OBESITY DUE TO EXCESS CALORIES (HCC): ICD-10-CM

## 2020-02-14 DIAGNOSIS — E06.3 HYPOTHYROIDISM DUE TO HASHIMOTO'S THYROIDITIS: ICD-10-CM

## 2020-02-14 DIAGNOSIS — E55.9 VITAMIN D DEFICIENCY: ICD-10-CM

## 2020-02-14 DIAGNOSIS — E28.2 POLYCYSTIC OVARIAN DISEASE: ICD-10-CM

## 2020-02-14 LAB
ALBUMIN SERPL-MCNC: 4 G/DL (ref 3.5–5.2)
ALBUMIN/GLOB SERPL: 1.3 G/DL
ALP SERPL-CCNC: 45 U/L (ref 39–117)
ALT SERPL W P-5'-P-CCNC: 17 U/L (ref 1–33)
ANION GAP SERPL CALCULATED.3IONS-SCNC: 13 MMOL/L (ref 5–15)
AST SERPL-CCNC: 11 U/L (ref 1–32)
BILIRUB SERPL-MCNC: 1.4 MG/DL (ref 0.2–1.2)
BUN BLD-MCNC: 7 MG/DL (ref 6–20)
BUN/CREAT SERPL: 7.6 (ref 7–25)
CALCIUM SPEC-SCNC: 9 MG/DL (ref 8.6–10.5)
CHLORIDE SERPL-SCNC: 102 MMOL/L (ref 98–107)
CO2 SERPL-SCNC: 25 MMOL/L (ref 22–29)
CREAT BLD-MCNC: 0.92 MG/DL (ref 0.57–1)
GFR SERPL CREATININE-BSD FRML MDRD: 67 ML/MIN/1.73
GLOBULIN UR ELPH-MCNC: 3 GM/DL
GLUCOSE BLD-MCNC: 89 MG/DL (ref 65–99)
POTASSIUM BLD-SCNC: 4.1 MMOL/L (ref 3.5–5.2)
PROT SERPL-MCNC: 7 G/DL (ref 6–8.5)
SODIUM BLD-SCNC: 140 MMOL/L (ref 136–145)
T4 FREE SERPL-MCNC: 1.26 NG/DL (ref 0.93–1.7)
TSH SERPL DL<=0.05 MIU/L-ACNC: 2.48 UIU/ML (ref 0.27–4.2)

## 2020-02-14 PROCEDURE — 80053 COMPREHEN METABOLIC PANEL: CPT

## 2020-02-14 PROCEDURE — 84443 ASSAY THYROID STIM HORMONE: CPT

## 2020-02-14 PROCEDURE — 84439 ASSAY OF FREE THYROXINE: CPT

## 2020-02-14 PROCEDURE — 36415 COLL VENOUS BLD VENIPUNCTURE: CPT

## 2020-02-21 ENCOUNTER — OFFICE VISIT (OUTPATIENT)
Dept: ENDOCRINOLOGY | Facility: CLINIC | Age: 40
End: 2020-02-21

## 2020-02-21 VITALS
WEIGHT: 282 LBS | HEART RATE: 73 BPM | DIASTOLIC BLOOD PRESSURE: 80 MMHG | OXYGEN SATURATION: 98 % | HEIGHT: 64 IN | SYSTOLIC BLOOD PRESSURE: 135 MMHG | BODY MASS INDEX: 48.14 KG/M2

## 2020-02-21 DIAGNOSIS — I10 BENIGN HYPERTENSION: ICD-10-CM

## 2020-02-21 DIAGNOSIS — E66.01 MORBID OBESITY DUE TO EXCESS CALORIES (HCC): ICD-10-CM

## 2020-02-21 DIAGNOSIS — E55.9 VITAMIN D DEFICIENCY: ICD-10-CM

## 2020-02-21 DIAGNOSIS — E28.2 POLYCYSTIC OVARIAN DISEASE: ICD-10-CM

## 2020-02-21 DIAGNOSIS — E06.3 HYPOTHYROIDISM DUE TO HASHIMOTO'S THYROIDITIS: Primary | ICD-10-CM

## 2020-02-21 DIAGNOSIS — E03.8 HYPOTHYROIDISM DUE TO HASHIMOTO'S THYROIDITIS: Primary | ICD-10-CM

## 2020-02-21 PROBLEM — H52.03 HYPEROPIA OF BOTH EYES: Status: ACTIVE | Noted: 2020-02-21

## 2020-02-21 PROBLEM — H53.009 AMBLYOPIA: Status: ACTIVE | Noted: 2020-02-21

## 2020-02-21 PROCEDURE — 99214 OFFICE O/P EST MOD 30 MIN: CPT | Performed by: INTERNAL MEDICINE

## 2020-02-21 NOTE — PROGRESS NOTES
Endocrine Progress Note Outpatient     Patient Care Team:  Lyell, Reggie Duane, MD as PCP - General  Lyell, Reggie Duane, MD as PCP - Family Medicine    Chief Complaint: Follow-up hypothyroidism    HPI: 39-year-old female with history of hypothyroidism, PCOS, vitamin D deficiency and obesity is here for follow-up.    For hypothyroidism: She is currently on levothyroxine 50 mcg p.o. daily.  She tells me that she is taking it on regular basis.  She does feel some tiredness, complaining of dry skin, hair loss and constipation.    Vitamin D deficiency: She is taking vitamin D 5000 units po daily.      PCOS: She has a stopped Spironolactone she is a status post lap band, she started having some hiccups and she is in process of removing the LAP-BAND.  He is working on her diet and activity.  She is currently taking metformin.    Past Medical History:   Diagnosis Date   • Anxiety    • Bone spur    • Cornea ulcer    • Female hirsutism    • H/O Hashimoto thyroiditis    • Hypertension    • IUD (intrauterine device) in place     presence of mirena IUD   • Kidney stone 2014   • Morbid obesity (CMS/HCC)    • Polycystic ovarian syndrome    • Pseudogout    • Vitamin D deficiency        Social History     Socioeconomic History   • Marital status:      Spouse name: Not on file   • Number of children: Not on file   • Years of education: Not on file   • Highest education level: Not on file   Tobacco Use   • Smoking status: Never Smoker   • Smokeless tobacco: Never Used   Substance and Sexual Activity   • Alcohol use: Yes     Comment: 3 times a year   • Drug use: No   • Sexual activity: Defer       Family History   Problem Relation Age of Onset   • Heart disease Mother    • Stroke Mother          of cerebral hemorrhage on Plavix, aspirin, and coumadin)   • Hypertension Mother    • Heart disease Father    • Diabetes Father    • Cancer Maternal Grandmother         colon cancer   • Heart disease Paternal Grandfather    •  Cancer Other         aunt - breast cancer       No Known Allergies    ROS:   Constitutional:  Admit fatigue, tiredness.    Eyes:  Denies change in visual acuity   HENT:  Denies nasal congestion or sore throat   Respiratory: denies cough, shortness of breath.   Cardiovascular:  denies chest pain, edema   GI:  Denies abdominal pain, nausea, vomiting.   Musculoskeletal:  Denies back pain or joint pain   Integument:  Admit dry skin and rash   Neurologic:  Denies headache, focal weakness or sensory changes   Endocrine:  Denies polyuria or polydipsia   Psychiatric:  Denies depression, admit anxiety      Vitals:    02/21/20 0853   BP: 135/80   Pulse: 73   SpO2: 98%       Physical Exam:  GEN: NAD, conversant, Obese  EYES: EOMI, PERRL, no conjunctival erythema  NECK: no thyromegaly, full ROM   CV: RRR, no murmurs/rubs/gallops, no peripheral edema  LUNG: CTAB, no wheezes/rales/ronchi  SKIN: no rashes, no acanthosis  MSK: no deformities, full ROM of all extremities  NEURO: no tremors, DTR normal  PSYCH: AOX3, appropriate mood, affect normal      Results Review:     I reviewed the patient's new clinical results.    Lab Results   Component Value Date    HGBA1C 4.9 08/16/2019      Lab Results   Component Value Date    GLUCOSE 89 02/14/2020    BUN 7 02/14/2020    CREATININE 0.92 02/14/2020    EGFRIFNONA 67 02/14/2020    EGFRIFAFRI 115 01/20/2017    BCR 7.6 02/14/2020    K 4.1 02/14/2020    CO2 25.0 02/14/2020    CALCIUM 9.0 02/14/2020    ALBUMIN 4.00 02/14/2020    LABIL2 1.2 02/08/2019    AST 11 02/14/2020    ALT 17 02/14/2020     Lab Results   Component Value Date    TSH 2.480 02/14/2020    FREET4 1.26 02/14/2020         Medication Review: Reviewed.       Current Outpatient Medications:   •  Cholecalciferol (VITAMIN D3) 1000 units capsule, Take 1 capsule by mouth Every 7 (Seven) Days., Disp: , Rfl:   •  levothyroxine (SYNTHROID, LEVOTHROID) 50 MCG tablet, Take 1 tablet by mouth Daily., Disp: 90 tablet, Rfl: 4  •  losartan  (COZAAR) 25 MG tablet, Take 1 tablet by mouth Daily., Disp: , Rfl:   •  metFORMIN (GLUCOPHAGE) 500 MG tablet, Take 1 tablet by mouth 2 (Two) Times a Day With Meals., Disp: 180 tablet, Rfl: 3      Assessment/Plan   Hypothyroidism: Well-controlled with TSH of 2.4.  We will continue levothyroxine 50 mcg p.o. daily.    Vitamin D deficiency: She takes vitamin D over-the-counter 5000 units p.o. daily.    PCOS: She does have IUD, she does not want to plan for pregnancy.  She is on metformin, I asked her to increase metformin to 500 mg twice a day and if she has symptoms like hirsutism or acne that she can consider spironolactone.    Obesity: She is working on her diet and activity, she is in process to remove lap band because of the hiccups.  We talked about the possibility of sleep apnea as she does have some fatigue and daytime sleepiness she may be snoring she is not sure about that.  I offered her for sleep study, she is going to think about it and discuss with her  and let us know she wants to proceed with it.          Adela Duncan MD FACE.

## 2020-02-21 NOTE — PATIENT INSTRUCTIONS
Continue current medications  Please make sure you continue vitamin D 5000 units p.o. daily  Follow-up in 6 months with labs  Please let us know if you want to proceed with sleep study.  Please continue to work on your diet and activity

## 2020-02-25 RX ORDER — LOSARTAN POTASSIUM 25 MG/1
25 TABLET ORAL EVERY 24 HOURS
Qty: 90 TABLET | Refills: 3 | Status: SHIPPED | OUTPATIENT
Start: 2020-02-25 | End: 2021-01-07

## 2020-07-10 ENCOUNTER — TELEPHONE (OUTPATIENT)
Dept: ENDOCRINOLOGY | Facility: CLINIC | Age: 40
End: 2020-07-10

## 2020-07-10 DIAGNOSIS — E06.3 HYPOTHYROIDISM DUE TO HASHIMOTO'S THYROIDITIS: ICD-10-CM

## 2020-07-10 DIAGNOSIS — E03.8 HYPOTHYROIDISM DUE TO HASHIMOTO'S THYROIDITIS: ICD-10-CM

## 2020-07-10 DIAGNOSIS — E55.9 VITAMIN D DEFICIENCY: Primary | ICD-10-CM

## 2020-07-10 DIAGNOSIS — E28.2 POLYCYSTIC OVARIAN DISEASE: ICD-10-CM

## 2020-07-10 NOTE — TELEPHONE ENCOUNTER
Patient called and stated she feels like her thyroid is off. She doesn't feel good. Please advise.

## 2020-07-13 ENCOUNTER — LAB (OUTPATIENT)
Dept: LAB | Facility: HOSPITAL | Age: 40
End: 2020-07-13

## 2020-07-13 DIAGNOSIS — E55.9 VITAMIN D DEFICIENCY: ICD-10-CM

## 2020-07-13 DIAGNOSIS — E03.8 HYPOTHYROIDISM DUE TO HASHIMOTO'S THYROIDITIS: ICD-10-CM

## 2020-07-13 DIAGNOSIS — E06.3 HYPOTHYROIDISM DUE TO HASHIMOTO'S THYROIDITIS: ICD-10-CM

## 2020-07-13 DIAGNOSIS — E28.2 POLYCYSTIC OVARIAN DISEASE: ICD-10-CM

## 2020-07-13 LAB
25(OH)D3 SERPL-MCNC: 44.1 NG/ML (ref 30–100)
ALBUMIN SERPL-MCNC: 3.7 G/DL (ref 3.5–5.2)
ALBUMIN/GLOB SERPL: 1.2 G/DL
ALP SERPL-CCNC: 37 U/L (ref 39–117)
ALT SERPL W P-5'-P-CCNC: 20 U/L (ref 1–33)
ANION GAP SERPL CALCULATED.3IONS-SCNC: 10.5 MMOL/L (ref 5–15)
AST SERPL-CCNC: 15 U/L (ref 1–32)
BILIRUB SERPL-MCNC: 1.1 MG/DL (ref 0–1.2)
BUN SERPL-MCNC: 7 MG/DL (ref 6–20)
BUN/CREAT SERPL: 8 (ref 7–25)
CALCIUM SPEC-SCNC: 9 MG/DL (ref 8.6–10.5)
CHLORIDE SERPL-SCNC: 104 MMOL/L (ref 98–107)
CO2 SERPL-SCNC: 25.5 MMOL/L (ref 22–29)
CREAT SERPL-MCNC: 0.88 MG/DL (ref 0.57–1)
GFR SERPL CREATININE-BSD FRML MDRD: 71 ML/MIN/1.73
GLOBULIN UR ELPH-MCNC: 3 GM/DL
GLUCOSE SERPL-MCNC: 100 MG/DL (ref 65–99)
POTASSIUM SERPL-SCNC: 4.5 MMOL/L (ref 3.5–5.2)
PROT SERPL-MCNC: 6.7 G/DL (ref 6–8.5)
SODIUM SERPL-SCNC: 140 MMOL/L (ref 136–145)
T4 FREE SERPL-MCNC: 1.26 NG/DL (ref 0.93–1.7)
TSH SERPL DL<=0.05 MIU/L-ACNC: 3.36 UIU/ML (ref 0.27–4.2)

## 2020-07-13 PROCEDURE — 36415 COLL VENOUS BLD VENIPUNCTURE: CPT

## 2020-07-13 PROCEDURE — 84443 ASSAY THYROID STIM HORMONE: CPT

## 2020-07-13 PROCEDURE — 82306 VITAMIN D 25 HYDROXY: CPT

## 2020-07-13 PROCEDURE — 84439 ASSAY OF FREE THYROXINE: CPT

## 2020-07-13 PROCEDURE — 80053 COMPREHEN METABOLIC PANEL: CPT

## 2020-07-14 DIAGNOSIS — E06.3 HYPOTHYROIDISM DUE TO HASHIMOTO'S THYROIDITIS: Primary | ICD-10-CM

## 2020-07-14 DIAGNOSIS — E03.8 HYPOTHYROIDISM DUE TO HASHIMOTO'S THYROIDITIS: Primary | ICD-10-CM

## 2020-10-22 RX ORDER — LEVOTHYROXINE SODIUM 50 MCG
TABLET ORAL
Qty: 90 TABLET | Refills: 3 | Status: SHIPPED | OUTPATIENT
Start: 2020-10-22 | End: 2020-10-30

## 2020-10-23 ENCOUNTER — LAB (OUTPATIENT)
Dept: LAB | Facility: HOSPITAL | Age: 40
End: 2020-10-23

## 2020-10-23 DIAGNOSIS — E06.3 HYPOTHYROIDISM DUE TO HASHIMOTO'S THYROIDITIS: ICD-10-CM

## 2020-10-23 DIAGNOSIS — E03.8 HYPOTHYROIDISM DUE TO HASHIMOTO'S THYROIDITIS: ICD-10-CM

## 2020-10-23 LAB
T4 FREE SERPL-MCNC: 1.22 NG/DL (ref 0.93–1.7)
TSH SERPL DL<=0.05 MIU/L-ACNC: 3.38 UIU/ML (ref 0.27–4.2)

## 2020-10-23 PROCEDURE — 84439 ASSAY OF FREE THYROXINE: CPT

## 2020-10-23 PROCEDURE — 36415 COLL VENOUS BLD VENIPUNCTURE: CPT

## 2020-10-23 PROCEDURE — 84443 ASSAY THYROID STIM HORMONE: CPT

## 2020-10-30 ENCOUNTER — OFFICE VISIT (OUTPATIENT)
Dept: ENDOCRINOLOGY | Facility: CLINIC | Age: 40
End: 2020-10-30

## 2020-10-30 VITALS
HEIGHT: 64 IN | SYSTOLIC BLOOD PRESSURE: 132 MMHG | TEMPERATURE: 97.8 F | BODY MASS INDEX: 50.02 KG/M2 | OXYGEN SATURATION: 95 % | DIASTOLIC BLOOD PRESSURE: 82 MMHG | HEART RATE: 89 BPM | WEIGHT: 293 LBS

## 2020-10-30 DIAGNOSIS — E66.01 MORBID OBESITY DUE TO EXCESS CALORIES (HCC): ICD-10-CM

## 2020-10-30 DIAGNOSIS — I10 BENIGN HYPERTENSION: ICD-10-CM

## 2020-10-30 DIAGNOSIS — E03.8 HYPOTHYROIDISM DUE TO HASHIMOTO'S THYROIDITIS: Primary | ICD-10-CM

## 2020-10-30 DIAGNOSIS — E55.9 VITAMIN D DEFICIENCY: ICD-10-CM

## 2020-10-30 DIAGNOSIS — E28.2 POLYCYSTIC OVARIAN DISEASE: ICD-10-CM

## 2020-10-30 DIAGNOSIS — E06.3 HYPOTHYROIDISM DUE TO HASHIMOTO'S THYROIDITIS: Primary | ICD-10-CM

## 2020-10-30 PROBLEM — H17.9 CORNEAL SCAR: Status: ACTIVE | Noted: 2020-08-26

## 2020-10-30 PROBLEM — H52.222 REGULAR ASTIGMATISM OF LEFT EYE: Status: ACTIVE | Noted: 2020-08-26

## 2020-10-30 PROCEDURE — 99214 OFFICE O/P EST MOD 30 MIN: CPT | Performed by: INTERNAL MEDICINE

## 2020-10-30 RX ORDER — LEVOTHYROXINE AND LIOTHYRONINE 19; 4.5 UG/1; UG/1
30 TABLET ORAL DAILY
Qty: 30 TABLET | Refills: 11 | Status: SHIPPED | OUTPATIENT
Start: 2020-10-30 | End: 2021-05-07

## 2020-10-30 RX ORDER — METFORMIN HYDROCHLORIDE 500 MG/1
TABLET, EXTENDED RELEASE ORAL
Qty: 60 TABLET | Refills: 6 | Status: SHIPPED | OUTPATIENT
Start: 2020-10-30 | End: 2022-12-23

## 2020-10-30 NOTE — PATIENT INSTRUCTIONS
DC levothyroxine and Metformin  Start [place] Thyroid 30 mg p.o. daily  Check TSH and free T4, free T3 and vitamin D in 6 weeks  Start metformin extended release 500 mg, 2 tablets daily  Check TSH, free T4, CMP before follow-up in 6 months

## 2020-12-10 ENCOUNTER — TELEPHONE (OUTPATIENT)
Dept: ENDOCRINOLOGY | Facility: CLINIC | Age: 40
End: 2020-12-10

## 2020-12-10 NOTE — TELEPHONE ENCOUNTER
TRIED TO CALL PT REGARDING LAB CLOSED 12/11/20. NO ANSWER, LEFT VM TELLING TO GET LABS DRAWN AT [facility] OR WITH PCP.

## 2021-01-07 RX ORDER — LOSARTAN POTASSIUM 25 MG/1
TABLET ORAL
Qty: 90 TABLET | Refills: 3 | Status: SHIPPED | OUTPATIENT
Start: 2021-01-07 | End: 2022-02-28

## 2021-01-08 ENCOUNTER — LAB (OUTPATIENT)
Dept: LAB | Facility: HOSPITAL | Age: 41
End: 2021-01-08

## 2021-01-08 DIAGNOSIS — I10 BENIGN HYPERTENSION: ICD-10-CM

## 2021-01-08 DIAGNOSIS — E55.9 VITAMIN D DEFICIENCY: ICD-10-CM

## 2021-01-08 DIAGNOSIS — E06.3 HYPOTHYROIDISM DUE TO HASHIMOTO'S THYROIDITIS: ICD-10-CM

## 2021-01-08 DIAGNOSIS — E03.8 HYPOTHYROIDISM DUE TO HASHIMOTO'S THYROIDITIS: ICD-10-CM

## 2021-01-08 LAB
T4 FREE SERPL-MCNC: 1.07 NG/DL (ref 0.93–1.7)
TSH SERPL DL<=0.05 MIU/L-ACNC: 2.8 UIU/ML (ref 0.27–4.2)

## 2021-01-08 PROCEDURE — 84443 ASSAY THYROID STIM HORMONE: CPT

## 2021-01-08 PROCEDURE — 84439 ASSAY OF FREE THYROXINE: CPT

## 2021-01-08 PROCEDURE — 36415 COLL VENOUS BLD VENIPUNCTURE: CPT

## 2021-01-18 ENCOUNTER — TELEPHONE (OUTPATIENT)
Dept: ENDOCRINOLOGY | Facility: CLINIC | Age: 41
End: 2021-01-18

## 2021-01-18 NOTE — TELEPHONE ENCOUNTER
I spoke with patient and gave her this information. She verbalized understanding. She states she still has that medication in the [facility]. Will restart on it and will contact us when she needs a refill.

## 2021-01-18 NOTE — TELEPHONE ENCOUNTER
Patient states ever since switching to the [place] Thyroid medication, she has had slowly progressing symptoms of dizziness, itching, headache, feeling hot but no fever (will feel hot but hands are cold). She is asking if it could be this medication.

## 2021-02-19 ENCOUNTER — TELEPHONE (OUTPATIENT)
Dept: FAMILY MEDICINE CLINIC | Facility: CLINIC | Age: 41
End: 2021-02-19

## 2021-02-19 NOTE — TELEPHONE ENCOUNTER
PATIENT CALLED STATED SHE MAY HAVE A COLD SORE IN THE RIGHT CORNER OF HER MOUTH.  PATIENT ASKED IF ABREVA WOULD BE BENEFICIAL BUT IF IT IS NOT A COLD SORE WOULD IT HAVE A NEGATIVE IMPACT ON THE SORE.    PATIENT STATED THAT SHE IS POST COVID AND WANTED TO KNOW IF THIS COULD BE COVID RELATED.    TINGLING AT THE SIGHT AND AS WELL ON HER TONGUE.    PLEASE ADVISE  [phone]

## 2021-04-30 ENCOUNTER — LAB (OUTPATIENT)
Dept: LAB | Facility: HOSPITAL | Age: 41
End: 2021-04-30

## 2021-04-30 DIAGNOSIS — E06.3 HYPOTHYROIDISM DUE TO HASHIMOTO'S THYROIDITIS: ICD-10-CM

## 2021-04-30 DIAGNOSIS — I10 BENIGN HYPERTENSION: ICD-10-CM

## 2021-04-30 DIAGNOSIS — E03.8 HYPOTHYROIDISM DUE TO HASHIMOTO'S THYROIDITIS: ICD-10-CM

## 2021-04-30 DIAGNOSIS — E55.9 VITAMIN D DEFICIENCY: ICD-10-CM

## 2021-04-30 LAB
25(OH)D3 SERPL-MCNC: 29.5 NG/ML
ALBUMIN SERPL-MCNC: 3.9 G/DL (ref 3.5–5.2)
ALBUMIN/GLOB SERPL: 1.5 G/DL
ALP SERPL-CCNC: 43 U/L (ref 39–117)
ALT SERPL W P-5'-P-CCNC: 24 U/L (ref 1–33)
ANION GAP SERPL CALCULATED.3IONS-SCNC: 7.8 MMOL/L (ref 5–15)
AST SERPL-CCNC: 14 U/L (ref 1–32)
BILIRUB SERPL-MCNC: 1.5 MG/DL (ref 0–1.2)
BUN SERPL-MCNC: 10 MG/DL (ref 6–20)
BUN/CREAT SERPL: 11.6 (ref 7–25)
CALCIUM SPEC-SCNC: 8.5 MG/DL (ref 8.6–10.5)
CHLORIDE SERPL-SCNC: 107 MMOL/L (ref 98–107)
CO2 SERPL-SCNC: 24.2 MMOL/L (ref 22–29)
CREAT SERPL-MCNC: 0.86 MG/DL (ref 0.57–1)
GFR SERPL CREATININE-BSD FRML MDRD: 72 ML/MIN/1.73
GLOBULIN UR ELPH-MCNC: 2.6 GM/DL
GLUCOSE SERPL-MCNC: 93 MG/DL (ref 65–99)
POTASSIUM SERPL-SCNC: 4.5 MMOL/L (ref 3.5–5.2)
PROT SERPL-MCNC: 6.5 G/DL (ref 6–8.5)
SODIUM SERPL-SCNC: 139 MMOL/L (ref 136–145)
T3FREE SERPL-MCNC: 2.96 PG/ML (ref 2–4.4)
T4 FREE SERPL-MCNC: 1.18 NG/DL (ref 0.93–1.7)
TSH SERPL DL<=0.05 MIU/L-ACNC: 2.44 UIU/ML (ref 0.27–4.2)

## 2021-04-30 PROCEDURE — 84439 ASSAY OF FREE THYROXINE: CPT

## 2021-04-30 PROCEDURE — 84481 FREE ASSAY (FT-3): CPT

## 2021-04-30 PROCEDURE — 82306 VITAMIN D 25 HYDROXY: CPT

## 2021-04-30 PROCEDURE — 80053 COMPREHEN METABOLIC PANEL: CPT

## 2021-04-30 PROCEDURE — 36415 COLL VENOUS BLD VENIPUNCTURE: CPT

## 2021-04-30 PROCEDURE — 84443 ASSAY THYROID STIM HORMONE: CPT

## 2021-05-07 ENCOUNTER — OFFICE VISIT (OUTPATIENT)
Dept: ENDOCRINOLOGY | Facility: CLINIC | Age: 41
End: 2021-05-07

## 2021-05-07 VITALS
TEMPERATURE: 97.3 F | SYSTOLIC BLOOD PRESSURE: 110 MMHG | OXYGEN SATURATION: 98 % | DIASTOLIC BLOOD PRESSURE: 70 MMHG | HEIGHT: 64 IN | HEART RATE: 93 BPM | WEIGHT: 293 LBS | BODY MASS INDEX: 50.02 KG/M2

## 2021-05-07 DIAGNOSIS — E03.8 HYPOTHYROIDISM DUE TO HASHIMOTO'S THYROIDITIS: Primary | ICD-10-CM

## 2021-05-07 DIAGNOSIS — I10 BENIGN HYPERTENSION: ICD-10-CM

## 2021-05-07 DIAGNOSIS — E06.3 HYPOTHYROIDISM DUE TO HASHIMOTO'S THYROIDITIS: Primary | ICD-10-CM

## 2021-05-07 DIAGNOSIS — E66.01 MORBID OBESITY DUE TO EXCESS CALORIES (HCC): ICD-10-CM

## 2021-05-07 DIAGNOSIS — E55.9 VITAMIN D DEFICIENCY: ICD-10-CM

## 2021-05-07 PROCEDURE — 99214 OFFICE O/P EST MOD 30 MIN: CPT | Performed by: INTERNAL MEDICINE

## 2021-05-07 RX ORDER — LEVOTHYROXINE SODIUM 50 MCG
TABLET ORAL
COMMUNITY
Start: 2021-04-11 | End: 2021-12-27

## 2021-05-07 NOTE — PATIENT INSTRUCTIONS
Start Os-Reuben D500/200, 1 tablet p.o. daily  Continue rest of the medications  Continue to work on your diet and activity  Labs before follow-up.

## 2021-05-07 NOTE — PROGRESS NOTES
Endocrine Progress Note Outpatient     Patient Care Team:  Lyell, Reggie Duane, MD as PCP - General  Lyell, Reggie Duane, MD as PCP - Family Medicine    Chief Complaint: Follow-up hypothyroidism    HPI: 41-year-old female with history of hypothyroidism, PCOS, vitamin D deficiency and obesity is here for follow-up.    For hypothyroidism: She is currently on levothyroxine 50 mcg p.o. daily.      Vitamin D deficiency: She is taking vitamin D 1000 units po daily.      PCOS: She has a stopped Spironolactone, she is a status post lap band, she started having some hiccups and she is in process of removing the LAP-BAND.  He is working on her diet and activity.  She is on Metformin  mg po daily and tolerating well.    Hypertension: Well controlled.     Past Medical History:   Diagnosis Date   • Anxiety    • Bone spur    • Cornea ulcer    • Female hirsutism    • H/O Hashimoto thyroiditis    • Hypertension    • IUD (intrauterine device) in place     presence of mirena IUD   • Kidney stone 2014   • Morbid obesity (CMS/HCC)    • Polycystic ovarian syndrome    • Pseudogout    • Vitamin D deficiency        Social History     Socioeconomic History   • Marital status:      Spouse name: Not on file   • Number of children: Not on file   • Years of education: Not on file   • Highest education level: Not on file   Tobacco Use   • Smoking status: Never Smoker   • Smokeless tobacco: Never Used   Vaping Use   • Vaping Use: Never used   Substance and Sexual Activity   • Alcohol use: Yes     Comment: 3 times a year   • Drug use: No   • Sexual activity: Defer       Family History   Problem Relation Age of Onset   • Heart disease Mother    • Stroke Mother          of cerebral hemorrhage on Plavix, aspirin, and coumadin)   • Hypertension Mother    • Heart disease Father    • Diabetes Father    • Cancer Maternal Grandmother         colon cancer   • Heart disease Paternal Grandfather    • Cancer Other         aunt - breast  cancer       No Known Allergies    ROS:   Constitutional:  Admit fatigue, tiredness.    Eyes:  Denies change in visual acuity   HENT:  Denies nasal congestion or sore throat   Respiratory: denies cough, shortness of breath.   Cardiovascular:  denies chest pain, edema   GI:  Denies abdominal pain, nausea, vomiting.   Musculoskeletal:  Denies back pain or joint pain   Integument:  Admit dry skin and rash   Neurologic:  Denies headache, focal weakness or sensory changes   Endocrine:  Denies polyuria or polydipsia   Psychiatric:  Denies depression, admit anxiety      Vitals:    05/07/21 0826   BP: 110/70   Pulse: 93   Temp: 97.3 °F (36.3 °C)   SpO2: 98%       Physical Exam:  GEN: NAD, conversant, Obese  EYES: EOMI, PERRL, no conjunctival erythema  NECK: no thyromegaly, full ROM   CV: RRR, no murmurs/rubs/gallops, no peripheral edema  LUNG: CTAB, no wheezes/rales/ronchi  SKIN: no rashes, no acanthosis  MSK: no deformities, full ROM of all extremities  NEURO: no tremors, DTR normal  PSYCH: AOX3, appropriate mood, affect normal      Results Review:     I reviewed the patient's new clinical results.    Lab Results   Component Value Date    HGBA1C 4.9 08/16/2019      Lab Results   Component Value Date    GLUCOSE 93 04/30/2021    BUN 10 04/30/2021    CREATININE 0.86 04/30/2021    EGFRIFNONA 72 04/30/2021    EGFRIFAFRI 115 01/20/2017    BCR 11.6 04/30/2021    K 4.5 04/30/2021    CO2 24.2 04/30/2021    CALCIUM 8.5 (L) 04/30/2021    ALBUMIN 3.90 04/30/2021    LABIL2 1.2 02/08/2019    AST 14 04/30/2021    ALT 24 04/30/2021     Lab Results   Component Value Date    TSH 2.440 04/30/2021    FREET4 1.18 04/30/2021         Medication Review: Reviewed.       Current Outpatient Medications:   •  Cholecalciferol (VITAMIN D3) 1000 units capsule, Take 1 capsule by mouth Daily., Disp: , Rfl:   •  losartan (COZAAR) 25 MG tablet, TAKE 1 TABLET DAILY, Disp: 90 tablet, Rfl: 3  •  metFORMIN ER (GLUCOPHAGE-XR) 500 MG 24 hr tablet, ToothTake 2  tablets p.o. daily, Disp: 60 tablet, Rfl: 6  •  Synthroid 50 MCG tablet, , Disp: , Rfl:       Assessment/Plan   Hypothyroidism: She is euthyroid, currently on levothyroxine 50 mcg p.o. daily.  She did try [place] Thyroid and could not tolerate it.  We will continue current dose of levothyroxine 50 mcg p.o. daily.    Vitamin D deficiency: She takes vitamin D over-the-counter 1000 units p.o. daily.    PCOS: She does have IUD, she does not want to plan for pregnancy.  She is tolerating Metformin extended release, will continue that.    Obesity: She is working on her diet and activity, she is in process to remove lap band because of the hiccups.    Hypertension: Well-controlled    Hypocalcemia: We will add Os-Reuben SARAVANAN Duncan MD FACE.

## 2021-06-16 ENCOUNTER — TELEPHONE (OUTPATIENT)
Dept: FAMILY MEDICINE CLINIC | Facility: CLINIC | Age: 41
End: 2021-06-16

## 2021-06-16 NOTE — TELEPHONE ENCOUNTER
A user error has taken place: encounter opened in error, closed for administrative reasons.

## 2021-11-05 ENCOUNTER — LAB (OUTPATIENT)
Dept: LAB | Facility: HOSPITAL | Age: 41
End: 2021-11-05

## 2021-11-05 DIAGNOSIS — E03.8 HYPOTHYROIDISM DUE TO HASHIMOTO'S THYROIDITIS: ICD-10-CM

## 2021-11-05 DIAGNOSIS — E06.3 HYPOTHYROIDISM DUE TO HASHIMOTO'S THYROIDITIS: ICD-10-CM

## 2021-11-05 DIAGNOSIS — E66.01 MORBID OBESITY DUE TO EXCESS CALORIES (HCC): ICD-10-CM

## 2021-11-05 DIAGNOSIS — I10 BENIGN HYPERTENSION: ICD-10-CM

## 2021-11-05 LAB
ALBUMIN SERPL-MCNC: 4 G/DL (ref 3.5–5.2)
ALBUMIN/GLOB SERPL: 1.4 G/DL
ALP SERPL-CCNC: 47 U/L (ref 39–117)
ALT SERPL W P-5'-P-CCNC: 17 U/L (ref 1–33)
ANION GAP SERPL CALCULATED.3IONS-SCNC: 6.7 MMOL/L (ref 5–15)
AST SERPL-CCNC: 15 U/L (ref 1–32)
BILIRUB SERPL-MCNC: 1.2 MG/DL (ref 0–1.2)
BUN SERPL-MCNC: 9 MG/DL (ref 6–20)
BUN/CREAT SERPL: 9.8 (ref 7–25)
CALCIUM SPEC-SCNC: 8.9 MG/DL (ref 8.6–10.5)
CHLORIDE SERPL-SCNC: 104 MMOL/L (ref 98–107)
CO2 SERPL-SCNC: 25.3 MMOL/L (ref 22–29)
CREAT SERPL-MCNC: 0.92 MG/DL (ref 0.57–1)
GFR SERPL CREATININE-BSD FRML MDRD: 67 ML/MIN/1.73
GLOBULIN UR ELPH-MCNC: 2.8 GM/DL
GLUCOSE SERPL-MCNC: 89 MG/DL (ref 65–99)
POTASSIUM SERPL-SCNC: 4.3 MMOL/L (ref 3.5–5.2)
PROT SERPL-MCNC: 6.8 G/DL (ref 6–8.5)
SODIUM SERPL-SCNC: 136 MMOL/L (ref 136–145)
T4 FREE SERPL-MCNC: 1.4 NG/DL (ref 0.93–1.7)
TSH SERPL DL<=0.05 MIU/L-ACNC: 2.79 UIU/ML (ref 0.27–4.2)

## 2021-11-05 PROCEDURE — 84443 ASSAY THYROID STIM HORMONE: CPT

## 2021-11-05 PROCEDURE — 36415 COLL VENOUS BLD VENIPUNCTURE: CPT

## 2021-11-05 PROCEDURE — 84439 ASSAY OF FREE THYROXINE: CPT

## 2021-11-05 PROCEDURE — 80053 COMPREHEN METABOLIC PANEL: CPT

## 2021-11-11 PROBLEM — M19.91 PRIMARY OSTEOARTHRITIS: Status: ACTIVE | Noted: 2021-11-11

## 2021-11-11 PROBLEM — G57.90 MONONEUROPATHY OF LOWER EXTREMITY: Status: ACTIVE | Noted: 2021-11-11

## 2021-11-11 PROBLEM — M23.359 DERANGEMENT OF POSTERIOR HORN OF LATERAL MENISCUS: Status: ACTIVE | Noted: 2021-11-11

## 2021-11-11 PROBLEM — S92.309A CLOSED FRACTURE OF METATARSAL BONE: Status: ACTIVE | Noted: 2021-11-11

## 2021-11-11 PROBLEM — M23.329 DERANGEMENT OF POSTERIOR HORN OF MEDIAL MENISCUS: Status: ACTIVE | Noted: 2021-11-11

## 2021-11-11 PROBLEM — M79.671 PAIN IN RIGHT FOOT: Status: ACTIVE | Noted: 2021-11-11

## 2021-11-11 PROBLEM — M25.469 KNEE JOINT EFFUSION: Status: ACTIVE | Noted: 2021-11-11

## 2021-11-11 PROBLEM — I10 HYPERTENSION: Status: ACTIVE | Noted: 2021-11-11

## 2021-11-11 PROBLEM — S92.324D: Status: ACTIVE | Noted: 2021-11-11

## 2021-11-11 PROBLEM — S92.919A CLOSED FRACTURE OF PHALANX OF FOOT: Status: ACTIVE | Noted: 2021-11-11

## 2021-11-11 PROBLEM — S92.424D: Status: ACTIVE | Noted: 2021-11-11

## 2021-11-11 RX ORDER — GABAPENTIN 300 MG/1
CAPSULE ORAL
COMMUNITY
Start: 2021-08-12 | End: 2022-12-23

## 2021-11-12 ENCOUNTER — OFFICE VISIT (OUTPATIENT)
Dept: ENDOCRINOLOGY | Facility: CLINIC | Age: 41
End: 2021-11-12

## 2021-11-12 VITALS
SYSTOLIC BLOOD PRESSURE: 115 MMHG | HEIGHT: 64 IN | HEART RATE: 82 BPM | DIASTOLIC BLOOD PRESSURE: 75 MMHG | OXYGEN SATURATION: 99 % | BODY MASS INDEX: 49.34 KG/M2 | TEMPERATURE: 97.1 F | WEIGHT: 289 LBS

## 2021-11-12 DIAGNOSIS — I10 BENIGN HYPERTENSION: ICD-10-CM

## 2021-11-12 DIAGNOSIS — E28.2 POLYCYSTIC OVARIAN DISEASE: ICD-10-CM

## 2021-11-12 DIAGNOSIS — E03.8 HYPOTHYROIDISM DUE TO HASHIMOTO'S THYROIDITIS: Primary | ICD-10-CM

## 2021-11-12 DIAGNOSIS — E55.9 VITAMIN D DEFICIENCY: ICD-10-CM

## 2021-11-12 DIAGNOSIS — E06.3 HYPOTHYROIDISM DUE TO HASHIMOTO'S THYROIDITIS: Primary | ICD-10-CM

## 2021-11-12 PROCEDURE — 99214 OFFICE O/P EST MOD 30 MIN: CPT | Performed by: INTERNAL MEDICINE

## 2021-11-12 NOTE — PROGRESS NOTES
Endocrine Progress Note Outpatient     Patient Care Team:  Lyell, Reggie Duane, MD as PCP - General  Lyell, Reggie Duane, MD as PCP - Family Medicine    Chief Complaint: Follow-up hypothyroidism    HPI: 41-year-old female with history of hypothyroidism, PCOS, vitamin D deficiency and obesity is here for follow-up.    For hypothyroidism: She is currently on levothyroxine 50 mcg p.o. daily.      Vitamin D deficiency: She is taking vitamin D 1000 units po daily.      PCOS: She has a stopped Spironolactone, she is a status post lap band, she started having some hiccups and she is in process of removing the LAP-BAND.  He is working on her diet and activity.  She is on Metformin  mg po daily and tolerating well.    Hypertension: Well controlled.     Past Medical History:   Diagnosis Date   • Anxiety    • Bone spur    • Cornea ulcer    • Female hirsutism    • H/O Hashimoto thyroiditis    • Hypertension    • IUD (intrauterine device) in place     presence of mirena IUD   • Kidney stone 2014   • Morbid obesity (HCC)    • Polycystic ovarian syndrome    • Pseudogout    • Vitamin D deficiency        Social History     Socioeconomic History   • Marital status:    Tobacco Use   • Smoking status: Never Smoker   • Smokeless tobacco: Never Used   Vaping Use   • Vaping Use: Never used   Substance and Sexual Activity   • Alcohol use: Yes     Comment: 3 times a year   • Drug use: No   • Sexual activity: Defer       Family History   Problem Relation Age of Onset   • Heart disease Mother    • Stroke Mother          of cerebral hemorrhage on Plavix, aspirin, and coumadin)   • Hypertension Mother    • Heart disease Father    • Diabetes Father    • Cancer Maternal Grandmother         colon cancer   • Heart disease Paternal Grandfather    • Cancer Other         aunt - breast cancer       No Known Allergies    ROS:   Constitutional:  Admit fatigue, tiredness.    Eyes:  Denies change in visual acuity   HENT:  Denies nasal  congestion or sore throat   Respiratory: denies cough, shortness of breath.   Cardiovascular:  denies chest pain, edema   GI:  Denies abdominal pain, nausea, vomiting.   Musculoskeletal:  Denies back pain or joint pain   Integument:  Admit dry skin and rash   Neurologic:  Denies headache, focal weakness or sensory changes   Endocrine:  Denies polyuria or polydipsia   Psychiatric:  Denies depression, admit anxiety      Vitals:    11/12/21 0840   BP: 115/75   Pulse: 82   Temp: 97.1 °F (36.2 °C)   SpO2: 99%       Physical Exam:  GEN: NAD, conversant, Obese  EYES: EOMI, PERRL, no conjunctival erythema  NECK: no thyromegaly, full ROM   CV: RRR, no murmurs/rubs/gallops, no peripheral edema  LUNG: CTAB, no wheezes/rales/ronchi  SKIN: no rashes, no acanthosis  MSK: no deformities, full ROM of all extremities  NEURO: no tremors, DTR normal  PSYCH: AOX3, appropriate mood, affect normal      Results Review:     I reviewed the patient's new clinical results.    Lab Results   Component Value Date    HGBA1C 4.9 08/16/2019      Lab Results   Component Value Date    GLUCOSE 89 11/05/2021    BUN 9 11/05/2021    CREATININE 0.92 11/05/2021    EGFRIFNONA 67 11/05/2021    EGFRIFAFRI 115 01/20/2017    BCR 9.8 11/05/2021    K 4.3 11/05/2021    CO2 25.3 11/05/2021    CALCIUM 8.9 11/05/2021    ALBUMIN 4.00 11/05/2021    LABIL2 1.2 02/08/2019    AST 15 11/05/2021    ALT 17 11/05/2021     Lab Results   Component Value Date    TSH 2.790 11/05/2021    FREET4 1.40 11/05/2021         Medication Review: Reviewed.       Current Outpatient Medications:   •  calcium-vitamin D (Oscal 500/200 D-3) 500-200 MG-UNIT per tablet, Take 1 tablet by mouth Daily., Disp: 100 tablet, Rfl: 4  •  Cholecalciferol (VITAMIN D3) 1000 units capsule, Take 1 capsule by mouth Daily., Disp: , Rfl:   •  gabapentin (NEURONTIN) 300 MG capsule, , Disp: , Rfl:   •  losartan (COZAAR) 25 MG tablet, TAKE 1 TABLET DAILY, Disp: 90 tablet, Rfl: 3  •  metFORMIN ER (GLUCOPHAGE-XR) 500  MG 24 hr tablet, ToothTake 2 tablets p.o. daily (Patient taking differently: Take 2 tablets p.o. daily), Disp: 60 tablet, Rfl: 6  •  Synthroid 50 MCG tablet, , Disp: , Rfl:       Assessment/Plan   Hypothyroidism: She is euthyroid, currently on levothyroxine 50 mcg p.o. daily.  She did try [place] Thyroid and could not tolerate it.  We will continue current dose of levothyroxine 50 mcg p.o. daily.    Vitamin D deficiency: She takes vitamin D over-the-counter 1000 units p.o. daily.    PCOS: She does have IUD, she does not want to plan for pregnancy.  She is tolerating Metformin extended release, will continue that.    Obesity: She is working on her diet and activity, she is in process to remove lap band because of the hiccups. She has lost 5 lbs since last seen.     Hypertension: Well-controlled    Hypocalcemia: She forgot to take calcium but she will try some chewable calcium in the future.          Adela Duncan MD FACE.

## 2021-11-18 ENCOUNTER — OFFICE VISIT (OUTPATIENT)
Dept: FAMILY MEDICINE CLINIC | Facility: CLINIC | Age: 41
End: 2021-11-18

## 2021-11-18 VITALS
BODY MASS INDEX: 49.68 KG/M2 | RESPIRATION RATE: 18 BRPM | HEART RATE: 79 BPM | HEIGHT: 64 IN | SYSTOLIC BLOOD PRESSURE: 120 MMHG | DIASTOLIC BLOOD PRESSURE: 84 MMHG | TEMPERATURE: 97.5 F | WEIGHT: 291 LBS | OXYGEN SATURATION: 97 %

## 2021-11-18 DIAGNOSIS — I10 PRIMARY HYPERTENSION: ICD-10-CM

## 2021-11-18 DIAGNOSIS — E06.3 HYPOTHYROIDISM DUE TO HASHIMOTO'S THYROIDITIS: ICD-10-CM

## 2021-11-18 DIAGNOSIS — E03.8 HYPOTHYROIDISM DUE TO HASHIMOTO'S THYROIDITIS: ICD-10-CM

## 2021-11-18 DIAGNOSIS — L68.0 HIRSUTISM: ICD-10-CM

## 2021-11-18 DIAGNOSIS — E28.2 POLYCYSTIC OVARIAN DISEASE: ICD-10-CM

## 2021-11-18 DIAGNOSIS — E55.9 VITAMIN D DEFICIENCY: ICD-10-CM

## 2021-11-18 DIAGNOSIS — M54.50 ACUTE BILATERAL LOW BACK PAIN WITHOUT SCIATICA: ICD-10-CM

## 2021-11-18 DIAGNOSIS — I10 BENIGN HYPERTENSION: ICD-10-CM

## 2021-11-18 DIAGNOSIS — Z00.00 WELLNESS EXAMINATION: Primary | ICD-10-CM

## 2021-11-18 DIAGNOSIS — G57.91 MONONEUROPATHY OF RIGHT LOWER EXTREMITY: ICD-10-CM

## 2021-11-18 DIAGNOSIS — N91.1 AMENORRHEA, SECONDARY: ICD-10-CM

## 2021-11-18 DIAGNOSIS — F41.9 ANXIETY: ICD-10-CM

## 2021-11-18 DIAGNOSIS — E66.01 MORBID OBESITY DUE TO EXCESS CALORIES (HCC): ICD-10-CM

## 2021-11-18 DIAGNOSIS — Z13.220 LIPID SCREENING: ICD-10-CM

## 2021-11-18 PROBLEM — S92.424D: Status: RESOLVED | Noted: 2021-11-11 | Resolved: 2021-11-18

## 2021-11-18 PROBLEM — B37.31 CANDIDIASIS OF VAGINA: Status: RESOLVED | Noted: 2018-04-27 | Resolved: 2021-11-18

## 2021-11-18 PROBLEM — L03.811 CELLULITIS OF SCALP: Status: RESOLVED | Noted: 2018-04-27 | Resolved: 2021-11-18

## 2021-11-18 PROBLEM — S92.324D: Status: RESOLVED | Noted: 2021-11-11 | Resolved: 2021-11-18

## 2021-11-18 PROBLEM — E66.3 OVERWEIGHT: Status: RESOLVED | Noted: 2018-01-22 | Resolved: 2021-11-18

## 2021-11-18 LAB
BILIRUB BLD-MCNC: NEGATIVE MG/DL
CLARITY, POC: CLEAR
COLOR UR: YELLOW
EXPIRATION DATE: NORMAL
GLUCOSE UR STRIP-MCNC: NEGATIVE MG/DL
KETONES UR QL: NEGATIVE
LEUKOCYTE EST, POC: NEGATIVE
Lab: NORMAL
NITRITE UR-MCNC: NEGATIVE MG/ML
PH UR: 6 [PH] (ref 5–8)
PROT UR STRIP-MCNC: NEGATIVE MG/DL
RBC # UR STRIP: NEGATIVE /UL
SP GR UR: 1.02 (ref 1–1.03)
UROBILINOGEN UR QL: NORMAL

## 2021-11-18 PROCEDURE — 99396 PREV VISIT EST AGE 40-64: CPT | Performed by: FAMILY MEDICINE

## 2021-11-18 PROCEDURE — 81003 URINALYSIS AUTO W/O SCOPE: CPT | Performed by: FAMILY MEDICINE

## 2021-11-18 NOTE — ASSESSMENT & PLAN NOTE
Patient was worried about kidney stones.  Urinalysis is normal.  Most likely musculoskeletal soft tissue strain.

## 2021-11-18 NOTE — ASSESSMENT & PLAN NOTE
Discussed injury prevention, diet and exercise, safe sexual practices, and screening for common diseases. Encouraged use of sunscreen and seatbelts. Encouraged SBE, avoidance of tobacco, limiting alcohol, and yearly dental and eye exams.  Followed by gynecology.  Recent mammogram normal.  Recent Pap normal.  Discuss timing for colon cancer.

## 2021-11-18 NOTE — PROGRESS NOTES
Chief Complaint   Patient presents with   • Annual Exam        Subjective   Tiara Dewitt is a 41 y.o.  female who presents today for wellness.     Tiara Dewitt  has a past medical history of Anxiety, Bone spur, Cornea ulcer, Female hirsutism, H/O Hashimoto thyroiditis, Hypertension, IUD (intrauterine device) in place, Kidney stone (2014), Morbid obesity (HCC), Polycystic ovarian syndrome, Pseudogout, and Vitamin D deficiency.    No Known Allergies    Current Outpatient Medications:   •  Cholecalciferol (VITAMIN D3) 1000 units capsule, Take 1 capsule by mouth Daily., Disp: , Rfl:   •  gabapentin (NEURONTIN) 300 MG capsule, , Disp: , Rfl:   •  losartan (COZAAR) 25 MG tablet, TAKE 1 TABLET DAILY, Disp: 90 tablet, Rfl: 3  •  metFORMIN ER (GLUCOPHAGE-XR) 500 MG 24 hr tablet, ToothTake 2 tablets p.o. daily (Patient taking differently: Take 2 tablets p.o. daily), Disp: 60 tablet, Rfl: 6  •  Synthroid 50 MCG tablet, , Disp: , Rfl:   •  calcium-vitamin D (Oscal 500/200 D-3) 500-200 MG-UNIT per tablet, Take 1 tablet by mouth Daily., Disp: 100 tablet, Rfl: 4  Past Medical History:   Diagnosis Date   • Anxiety    • Bone spur    • Cornea ulcer    • Female hirsutism    • H/O Hashimoto thyroiditis    • Hypertension    • IUD (intrauterine device) in place    • Kidney stone 2014   • Morbid obesity (HCC)    • Polycystic ovarian syndrome    • Pseudogout    • Vitamin D deficiency      Past Surgical History:   Procedure Laterality Date   •  SECTION       X 2 2007, 2005   • KNEE ARTHROSCOPY  2012     X 2   • LAPAROSCOPIC CHOLECYSTECTOMY     • LAPAROSCOPIC GASTRIC BANDING       Social History     Socioeconomic History   • Marital status:    Tobacco Use   • Smoking status: Never Smoker   • Smokeless tobacco: Never Used   Vaping Use   • Vaping Use: Never used   Substance and Sexual Activity   • Alcohol use: Yes     Comment: 3 times a year   • Drug use: No   • Sexual activity: Defer     Family  History   Problem Relation Age of Onset   • Heart disease Mother    • Stroke Mother          of cerebral hemorrhage on Plavix, aspirin, and coumadin)   • Hypertension Mother    • Heart disease Father    • Diabetes Father    • Cancer Maternal Grandmother         colon cancer   • Heart disease Paternal Grandfather    • Cancer Other         aunt - breast cancer     Current Outpatient Medications on File Prior to Visit   Medication Sig Dispense Refill   • Cholecalciferol (VITAMIN D3) 1000 units capsule Take 1 capsule by mouth Daily.     • gabapentin (NEURONTIN) 300 MG capsule      • losartan (COZAAR) 25 MG tablet TAKE 1 TABLET DAILY 90 tablet 3   • metFORMIN ER (GLUCOPHAGE-XR) 500 MG 24 hr tablet ToothTake 2 tablets p.o. daily (Patient taking differently: Take 2 tablets p.o. daily) 60 tablet 6   • Synthroid 50 MCG tablet      • calcium-vitamin D (Oscal 500/200 D-3) 500-200 MG-UNIT per tablet Take 1 tablet by mouth Daily. 100 tablet 4     No current facility-administered medications on file prior to visit.        Family history, surgical history, past medical history, Allergies and med's reviewed with patient today and updated in [facility] EMR.     ROS:  Review of Systems   Constitutional: Positive for fatigue. Negative for activity change, appetite change, chills, fever and unexpected weight change.   HENT: Negative for congestion, ear discharge, ear pain, facial swelling, hearing loss, nosebleeds, postnasal drip, rhinorrhea, sinus pressure, sinus pain, sneezing, sore throat, tinnitus, trouble swallowing and voice change.    Eyes: Negative for photophobia, pain, discharge, redness, itching and visual disturbance.   Respiratory: Negative for apnea, cough, choking, chest tightness, shortness of breath and wheezing.    Cardiovascular: Negative for chest pain and palpitations.   Gastrointestinal: Negative for abdominal distention, abdominal pain, blood in stool, constipation, diarrhea, nausea and vomiting.   Endocrine:  "Negative for cold intolerance, heat intolerance, polydipsia and polyphagia.   Genitourinary: Negative for difficulty urinating, dysuria, enuresis, flank pain, frequency, hematuria, pelvic pain and urgency.   Musculoskeletal: Positive for back pain. Negative for arthralgias, gait problem, joint swelling, myalgias, neck pain and neck stiffness.   Skin: Negative for pallor, rash and wound.   Allergic/Immunologic: Negative for environmental allergies and food allergies.   Neurological: Negative for dizziness, tremors, seizures, syncope, speech difficulty, weakness, light-headedness, numbness and headaches.   Hematological: Negative for adenopathy. Does not bruise/bleed easily.   Psychiatric/Behavioral: Negative for confusion, decreased concentration, hallucinations and sleep disturbance. The patient is not nervous/anxious.        OBJECTIVE:  Vitals:    11/18/21 1301   BP: 120/84   BP Location: Left arm   Patient Position: Sitting   Cuff Size: Large Adult   Pulse: 79   Resp: 18   Temp: 97.5 °F (36.4 °C)   TempSrc: Infrared   SpO2: 97%   Weight: 132 kg (291 lb)   Height: 162.6 cm (64.02\")     Body mass index is 49.93 kg/m².  Physical Exam  Constitutional:       Appearance: She is well-developed.   HENT:      Head: Normocephalic and atraumatic.   Eyes:      General: No scleral icterus.        Right eye: No discharge.         Left eye: No discharge.      Conjunctiva/sclera: Conjunctivae normal.      Pupils: Pupils are equal, round, and reactive to light.   Neck:      Thyroid: No thyromegaly.   Cardiovascular:      Rate and Rhythm: Normal rate and regular rhythm.      Heart sounds: Normal heart sounds. No murmur heard.  No friction rub. No gallop.    Pulmonary:      Effort: Pulmonary effort is normal. No respiratory distress.      Breath sounds: Normal breath sounds. No wheezing or rales.   Abdominal:      General: Bowel sounds are normal. There is no distension.      Palpations: Abdomen is soft. There is no mass.      " Tenderness: There is no abdominal tenderness. There is no guarding or rebound.   Musculoskeletal:         General: No tenderness or deformity. Normal range of motion.   Lymphadenopathy:      Cervical: No cervical adenopathy.   Skin:     General: Skin is warm and dry.      Findings: No erythema or rash.   Neurological:      Mental Status: She is alert and oriented to person, place, and time.      Cranial Nerves: No cranial nerve deficit.      Sensory: No sensory deficit.      Motor: No abnormal muscle tone.      Coordination: Coordination normal.      Deep Tendon Reflexes: Reflexes normal.   Psychiatric:         Behavior: Behavior normal.         Thought Content: Thought content normal.         Judgment: Judgment normal.         ASSESSMENT/ PLAN:    Diagnoses and all orders for this visit:    1. Wellness examination (Primary)  Assessment & Plan:  Discussed injury prevention, diet and exercise, safe sexual practices, and screening for common diseases. Encouraged use of sunscreen and seatbelts. Encouraged SBE, avoidance of tobacco, limiting alcohol, and yearly dental and eye exams.  Followed by gynecology.  Recent mammogram normal.  Recent Pap normal.  Discuss timing for colon cancer.      2. Benign hypertension  Assessment & Plan:  Doing well.  Continue losartan.    Orders:  -     CBC & Differential    3. Primary hypertension    4. Hypothyroidism due to Hashimoto's thyroiditis  Assessment & Plan:  Followed by endocrinology.  Blood work from recent visit reviewed.  Continue Synthroid at 50 mcg daily.      5. Morbid obesity due to excess calories (HCC)  Assessment & Plan:  Patient's (Body mass index is 49.93 kg/m².) indicates that they are morbidly obese (BMI > 40 or > 35 with obesity - related health condition) with health conditions that include hypertension and dyslipidemias . Weight is unchanged. BMI is is above average; BMI management plan is completed. We discussed portion control and increasing exercise.   Previous bariatric surgery.      6. Polycystic ovarian disease  Assessment & Plan:  Continue Metformin.      7. Vitamin D deficiency  Assessment & Plan:  Continue vitamin D and calcium supplementation.      8. Amenorrhea, secondary  Assessment & Plan:  Secondary to PCOS.      9. Anxiety  Assessment & Plan:  Stable off of medications.      10. Mononeuropathy of right lower extremity  Assessment & Plan:  Stable.  B complex vitamins encouraged.      11. Hirsutism    12. Lipid screening  -     Lipid Panel    13. Acute bilateral low back pain without sciatica  Assessment & Plan:  Patient was worried about kidney stones.  Urinalysis is normal.  Most likely musculoskeletal soft tissue strain.    Orders:  -     POC Urinalysis Dipstick, Automated      Orders Placed Today:     No orders of the defined types were placed in this encounter.       Management Plan:     An After Visit Summary was printed and given to the patient at discharge.    Follow-up: No follow-ups on file.    Reggie Duane Lyell, MD 11/18/2021 14:12 EST  This note was electronically signed.

## 2021-11-18 NOTE — ASSESSMENT & PLAN NOTE
Followed by endocrinology.  Blood work from recent visit reviewed.  Continue Synthroid at 50 mcg daily.

## 2021-11-18 NOTE — ASSESSMENT & PLAN NOTE
Patient's (Body mass index is 49.93 kg/m².) indicates that they are morbidly obese (BMI > 40 or > 35 with obesity - related health condition) with health conditions that include hypertension and dyslipidemias . Weight is unchanged. BMI is is above average; BMI management plan is completed. We discussed portion control and increasing exercise.  Previous bariatric surgery.

## 2021-11-19 LAB
BASOPHILS # BLD AUTO: 0.1 X10E3/UL (ref 0–0.2)
BASOPHILS NFR BLD AUTO: 1 %
CHOLEST SERPL-MCNC: 180 MG/DL (ref 100–199)
EOSINOPHIL # BLD AUTO: 0.2 X10E3/UL (ref 0–0.4)
EOSINOPHIL NFR BLD AUTO: 2 %
ERYTHROCYTE [DISTWIDTH] IN BLOOD BY AUTOMATED COUNT: 13.2 % (ref 11.7–15.4)
HCT VFR BLD AUTO: 44 % (ref 34–46.6)
HDLC SERPL-MCNC: 42 MG/DL
HGB BLD-MCNC: 14.8 G/DL (ref 11.1–15.9)
IMM GRANULOCYTES # BLD AUTO: 0 X10E3/UL (ref 0–0.1)
IMM GRANULOCYTES NFR BLD AUTO: 0 %
LDLC SERPL CALC-MCNC: 116 MG/DL (ref 0–99)
LYMPHOCYTES # BLD AUTO: 3.1 X10E3/UL (ref 0.7–3.1)
LYMPHOCYTES NFR BLD AUTO: 28 %
MCH RBC QN AUTO: 32.5 PG (ref 26.6–33)
MCHC RBC AUTO-ENTMCNC: 33.6 G/DL (ref 31.5–35.7)
MCV RBC AUTO: 97 FL (ref 79–97)
MONOCYTES # BLD AUTO: 0.9 X10E3/UL (ref 0.1–0.9)
MONOCYTES NFR BLD AUTO: 8 %
NEUTROPHILS # BLD AUTO: 6.9 X10E3/UL (ref 1.4–7)
NEUTROPHILS NFR BLD AUTO: 61 %
PLATELET # BLD AUTO: 308 X10E3/UL (ref 150–450)
RBC # BLD AUTO: 4.56 X10E6/UL (ref 3.77–5.28)
TRIGL SERPL-MCNC: 123 MG/DL (ref 0–149)
VLDLC SERPL CALC-MCNC: 22 MG/DL (ref 5–40)
WBC # BLD AUTO: 11.3 X10E3/UL (ref 3.4–10.8)

## 2021-12-07 ENCOUNTER — TELEPHONE (OUTPATIENT)
Dept: BARIATRICS/WEIGHT MGMT | Facility: CLINIC | Age: 41
End: 2021-12-07

## 2021-12-07 NOTE — TELEPHONE ENCOUNTER
Spoke to Tiara earlier and scheduled her for a new patient appt with Dr Troy, She had a lap band place at [facility] 2011 with Dr Garcia, she then transferred care to Dr Tinsley In [place] who then retired, she has been seeing Dr Byrnes at [facility], but her insurance needs her to use a BDC for bariatric surgery. She is wanting to see about having her band removed due to issues and hicups. She called back and LMSHANIQUEM asking about a band adjustment. She was unsure if she should go back to Dr Byrnes for that. I called her back and LMOVM that Dr Troy could do that on 12/17 when he sees her here in the office. I told her to call the office back if she had any questions

## 2021-12-17 ENCOUNTER — OFFICE VISIT (OUTPATIENT)
Dept: BARIATRICS/WEIGHT MGMT | Facility: CLINIC | Age: 41
End: 2021-12-17

## 2021-12-17 ENCOUNTER — PREP FOR SURGERY (OUTPATIENT)
Dept: OTHER | Facility: HOSPITAL | Age: 41
End: 2021-12-17

## 2021-12-17 VITALS
SYSTOLIC BLOOD PRESSURE: 114 MMHG | BODY MASS INDEX: 48.76 KG/M2 | HEIGHT: 64 IN | RESPIRATION RATE: 18 BRPM | DIASTOLIC BLOOD PRESSURE: 82 MMHG | TEMPERATURE: 98.2 F | WEIGHT: 285.6 LBS | OXYGEN SATURATION: 97 % | HEART RATE: 82 BPM

## 2021-12-17 DIAGNOSIS — E66.01 OBESITY, CLASS III, BMI 40-49.9 (MORBID OBESITY) (HCC): Primary | ICD-10-CM

## 2021-12-17 DIAGNOSIS — R06.6 INTRACTABLE HICCUPS: Primary | ICD-10-CM

## 2021-12-17 PROCEDURE — 99203 OFFICE O/P NEW LOW 30 MIN: CPT | Performed by: SURGERY

## 2021-12-17 RX ORDER — SODIUM CHLORIDE, SODIUM LACTATE, POTASSIUM CHLORIDE, CALCIUM CHLORIDE 600; 310; 30; 20 MG/100ML; MG/100ML; MG/100ML; MG/100ML
30 INJECTION, SOLUTION INTRAVENOUS CONTINUOUS
Status: CANCELLED | OUTPATIENT
Start: 2021-12-17

## 2021-12-17 NOTE — PROGRESS NOTES
HISTORY AND PHYSICAL      Patient Care Team:  Lyell, Reggie Duane, MD as PCP - General  Lyell, Reggie Duane, MD as PCP - Family Medicine    Chief complaint intractable hiccups    Subjective     Patient is a 41 y.o. female presents with intractable hiccups that she has had for years. She had a laparoscopic gastric band placed in . This was done by Dr. Young and she later followed up with Dr. Byrnes who has been adjusting her pain in the last couple years.    Her hiccups are really high-pitched and uncontrollable. It is very embarrassing for her in her tracks tension. She is very emotionally affected by this.    She is not any dysphagia or nausea.        Review of Systems   Pertinent items are noted in HPI    History  Past Medical History:   Diagnosis Date   • Anxiety    • Bone spur    • Cornea ulcer    • Female hirsutism    • H/O Hashimoto thyroiditis    • Hypertension    • IUD (intrauterine device) in place     presence of mirena IUD   • Kidney stone 2014   • Morbid obesity (HCC)    • Polycystic ovarian syndrome    • Pseudogout    • Vitamin D deficiency      Past Surgical History:   Procedure Laterality Date   •  SECTION       X 2 2007, 2005   • KNEE ARTHROSCOPY  2012     X 2   • LAPAROSCOPIC CHOLECYSTECTOMY     • LAPAROSCOPIC GASTRIC BANDING       Family History   Problem Relation Age of Onset   • Heart disease Mother    • Stroke Mother          of cerebral hemorrhage on Plavix, aspirin, and coumadin)   • Hypertension Mother    • Heart disease Father    • Diabetes Father    • Cancer Maternal Grandmother         colon cancer   • Heart disease Paternal Grandfather    • Cancer Other         aunt - breast cancer     Social History     Tobacco Use   • Smoking status: Never Smoker   • Smokeless tobacco: Never Used   Vaping Use   • Vaping Use: Never used   Substance Use Topics   • Alcohol use: Yes     Comment: 3 times a year   • Drug use: No     (Not in a hospital  admission)    Allergies:  Patient has no known allergies.    Objective     Vital Signs  Temp:  [98.2 °F (36.8 °C)] 98.2 °F (36.8 °C)  Heart Rate:  [82] 82  Resp:  [18] 18  BP: (114)/(82) 114/82    Physical Exam:      General Appearance:    Alert, cooperative, in no acute distress   Head:    Normocephalic, without obvious abnormality, atraumatic   Eyes:            Lids and lashes normal, conjunctivae and sclerae normal, no   icterus, no pallor, corneas clear, PERRLA   Ears:    Ears appear intact with no abnormalities noted   Throat:   No oral lesions, no thrush, oral mucosa moist   Neck:   No adenopathy, supple, trachea midline, no thyromegaly, no   carotid bruit, no JVD   Back:     No kyphosis present, no scoliosis present, no skin lesions,      erythema or scars, no tenderness to percussion or                   palpation,   range of motion normal   Lungs:     Clear to auscultation,respirations regular, even and                  unlabored    Heart:    Regular rhythm and normal rate, normal S1 and S2, no            murmur, no gallop, no rub, no click   Chest Wall:    No abnormalities observed   Abdomen:    port palpable in the epigastric region. Normal bowel sounds, no masses, no organomegaly, soft        non-tender, non-distended, no guarding, no rebound                tenderness   Rectal:     Deferred   Extremities:   Moves all extremities well, no edema, no cyanosis, no             redness   Pulses:   Pulses palpable and equal bilaterally   Skin:   No bleeding, bruising or rash   Lymph nodes:   No palpable adenopathy   Neurologic:   Cranial nerves 2 - 12 grossly intact, sensation intact, DTR       present and equal bilaterally     Lab Results (last 24 hours)     ** No results found for the last 24 hours. **          Imaging Results (Last 24 Hours)     ** No results found for the last 24 hours. **          Results Review:    I reviewed the patient's new clinical results.  I reviewed the patient's new imaging  results and agree with the interpretation.    Assessment/Plan   Intractable hiccups  History of lap band placement      We will plan for upper endoscopy to evaluate the positioning of the band. Today under sterile technique I was able to remove 7 cc from the band and hopefully this will help her but she says she actually had another Dr. Do this before  and it did not help.    Karen Troy MD  12/17/21  09:41 EST

## 2021-12-27 RX ORDER — LEVOTHYROXINE SODIUM 50 MCG
TABLET ORAL
Qty: 90 TABLET | Refills: 3 | Status: SHIPPED | OUTPATIENT
Start: 2021-12-27 | End: 2022-11-30

## 2022-02-28 RX ORDER — LOSARTAN POTASSIUM 25 MG/1
TABLET ORAL
Qty: 90 TABLET | Refills: 3 | Status: SHIPPED | OUTPATIENT
Start: 2022-02-28 | End: 2023-02-21

## 2022-11-04 ENCOUNTER — LAB (OUTPATIENT)
Dept: LAB | Facility: HOSPITAL | Age: 42
End: 2022-11-04

## 2022-11-04 DIAGNOSIS — E55.9 VITAMIN D DEFICIENCY: ICD-10-CM

## 2022-11-04 DIAGNOSIS — E06.3 HYPOTHYROIDISM DUE TO HASHIMOTO'S THYROIDITIS: ICD-10-CM

## 2022-11-04 DIAGNOSIS — E03.8 HYPOTHYROIDISM DUE TO HASHIMOTO'S THYROIDITIS: ICD-10-CM

## 2022-11-04 LAB
25(OH)D3 SERPL-MCNC: 53.7 NG/ML (ref 30–100)
ALBUMIN SERPL-MCNC: 4.3 G/DL (ref 3.5–5.2)
ALBUMIN/GLOB SERPL: 1.4 G/DL
ALP SERPL-CCNC: 57 U/L (ref 39–117)
ALT SERPL W P-5'-P-CCNC: 17 U/L (ref 1–33)
ANION GAP SERPL CALCULATED.3IONS-SCNC: 9.1 MMOL/L (ref 5–15)
AST SERPL-CCNC: 15 U/L (ref 1–32)
BILIRUB SERPL-MCNC: 1.3 MG/DL (ref 0–1.2)
BUN SERPL-MCNC: 10 MG/DL (ref 6–20)
BUN/CREAT SERPL: 10.4 (ref 7–25)
CALCIUM SPEC-SCNC: 9.2 MG/DL (ref 8.6–10.5)
CHLORIDE SERPL-SCNC: 104 MMOL/L (ref 98–107)
CO2 SERPL-SCNC: 27.9 MMOL/L (ref 22–29)
CREAT SERPL-MCNC: 0.96 MG/DL (ref 0.57–1)
EGFRCR SERPLBLD CKD-EPI 2021: 75.4 ML/MIN/1.73
GLOBULIN UR ELPH-MCNC: 3 GM/DL
GLUCOSE SERPL-MCNC: 120 MG/DL (ref 65–99)
POTASSIUM SERPL-SCNC: 4 MMOL/L (ref 3.5–5.2)
PROT SERPL-MCNC: 7.3 G/DL (ref 6–8.5)
SODIUM SERPL-SCNC: 141 MMOL/L (ref 136–145)
T4 FREE SERPL-MCNC: 1.23 NG/DL (ref 0.93–1.7)
TSH SERPL DL<=0.05 MIU/L-ACNC: 2.74 UIU/ML (ref 0.27–4.2)

## 2022-11-04 PROCEDURE — 36415 COLL VENOUS BLD VENIPUNCTURE: CPT

## 2022-11-04 PROCEDURE — 84443 ASSAY THYROID STIM HORMONE: CPT

## 2022-11-04 PROCEDURE — 80053 COMPREHEN METABOLIC PANEL: CPT

## 2022-11-04 PROCEDURE — 84439 ASSAY OF FREE THYROXINE: CPT

## 2022-11-04 PROCEDURE — 82306 VITAMIN D 25 HYDROXY: CPT

## 2022-11-30 RX ORDER — LEVOTHYROXINE SODIUM 50 MCG
TABLET ORAL
Qty: 90 TABLET | Refills: 0 | Status: SHIPPED | OUTPATIENT
Start: 2022-11-30 | End: 2023-03-13

## 2022-12-23 ENCOUNTER — TELEMEDICINE (OUTPATIENT)
Dept: ENDOCRINOLOGY | Facility: CLINIC | Age: 42
End: 2022-12-23

## 2022-12-23 DIAGNOSIS — E28.2 POLYCYSTIC OVARIAN DISEASE: ICD-10-CM

## 2022-12-23 DIAGNOSIS — I10 BENIGN HYPERTENSION: ICD-10-CM

## 2022-12-23 DIAGNOSIS — E03.8 HYPOTHYROIDISM DUE TO HASHIMOTO'S THYROIDITIS: Primary | ICD-10-CM

## 2022-12-23 DIAGNOSIS — E55.9 VITAMIN D DEFICIENCY: ICD-10-CM

## 2022-12-23 DIAGNOSIS — E06.3 HYPOTHYROIDISM DUE TO HASHIMOTO'S THYROIDITIS: Primary | ICD-10-CM

## 2022-12-23 PROCEDURE — 99214 OFFICE O/P EST MOD 30 MIN: CPT | Performed by: INTERNAL MEDICINE

## 2022-12-23 NOTE — PROGRESS NOTES
Endocrine Progress Note Outpatient     Patient Care Team:  Kimmy Carter APRN as PCP - General (Nurse Practitioner)  You have chosen to receive care through a telehealth visit.  Do you consent to use a video/audio connection for your medical care today? Yes    Chief Complaint: Follow-up hypothyroidism: Visit conducted through [facility]    HPI: 42-year-old female with history of hypothyroidism, PCOS, vitamin D deficiency and obesity is followed through telehealth    For hypothyroidism: She is currently on levothyroxine 50 mcg p.o. daily.      Vitamin D deficiency: She is taking vitamin D 1000 units po daily.      PCOS: She has a stopped Spironolactone, she is a status post lap band, she started having some hiccups and she is in process of removing the LAP-BAND.  He is working on her diet and activity.  She stopped Metformin as she is not able to swallow pills due to gastric banding in past.     Hypertension: Well controlled.     Past Medical History:   Diagnosis Date   • Anxiety    • Bone spur    • Cornea ulcer    • Cough 2022   • Female hirsutism    • H/O Hashimoto thyroiditis    • History of kidney stones    • Hypertension    • IUD (intrauterine device) in place     presence of mirena IUD   • Kidney stone 2014   • Migraine     few times per year   • Morbid obesity (HCC)    • Nasal congestion 2022   • Polycystic ovarian syndrome    • Pseudogout    • Vitamin D deficiency        Social History     Socioeconomic History   • Marital status:    Tobacco Use   • Smoking status: Never   • Smokeless tobacco: Never   Vaping Use   • Vaping Use: Never used   Substance and Sexual Activity   • Alcohol use: Yes     Comment: 3 times a year   • Drug use: No   • Sexual activity: Defer       Family History   Problem Relation Age of Onset   • Heart disease Mother    • Stroke Mother          of cerebral hemorrhage on Plavix, aspirin, and coumadin)   • Hypertension Mother    • Heart disease Father    •  Diabetes Father    • Cancer Maternal Grandmother         colon cancer   • Heart disease Paternal Grandfather    • Cancer Other         aunt - breast cancer       No Known Allergies    ROS:   Constitutional:  Admit fatigue, tiredness.    Eyes:  Denies change in visual acuity   HENT:  Denies nasal congestion or sore throat   Respiratory: denies cough, shortness of breath.   Cardiovascular:  denies chest pain, edema   GI:  Denies abdominal pain, nausea, vomiting.   Musculoskeletal:  Denies back pain or joint pain   Integument:  Admit dry skin and rash   Neurologic:  Denies headache, focal weakness or sensory changes   Endocrine:  Denies polyuria or polydipsia   Psychiatric:  Denies depression, admit anxiety      There were no vitals filed for this visit.    Physical Exam:  GEN: NAD, conversant, Obese  PSYCH: AOX3, appropriate mood, affect normal      Results Review:     I reviewed the patient's new clinical results.    Lab Results   Component Value Date    HGBA1C 4.9 08/16/2019      Lab Results   Component Value Date    GLUCOSE 120 (H) 11/04/2022    BUN 10 11/04/2022    CREATININE 0.96 11/04/2022    EGFRIFNONA 67 11/05/2021    EGFRIFAFRI 115 01/20/2017    BCR 10.4 11/04/2022    K 4.0 11/04/2022    CO2 27.9 11/04/2022    CALCIUM 9.2 11/04/2022    ALBUMIN 4.30 11/04/2022    LABIL2 1.2 02/08/2019    AST 15 11/04/2022    ALT 17 11/04/2022    TRIG 123 11/18/2021     (H) 11/18/2021    HDL 42 11/18/2021     Lab Results   Component Value Date    TSH 2.740 11/04/2022    FREET4 1.23 11/04/2022     Vitamin D 25 Hydroxy (11/04/2022 08:48)       Medication Review: Reviewed.       Current Outpatient Medications:   •  Cholecalciferol (VITAMIN D3) 1000 units capsule, Take 1 capsule by mouth Daily., Disp: , Rfl:   •  gabapentin (NEURONTIN) 300 MG capsule, , Disp: , Rfl:   •  losartan (COZAAR) 25 MG tablet, TAKE 1 TABLET DAILY, Disp: 90 tablet, Rfl: 3  •  metFORMIN ER (GLUCOPHAGE-XR) 500 MG 24 hr tablet, ToothTake 2 tablets p.o.  daily (Patient taking differently: Take 2 tablets p.o. daily Not dos (but not taking anyhow now)), Disp: 60 tablet, Rfl: 6  •  naproxen sodium (ALEVE) 220 MG tablet, Take 220 mg by mouth 2 (Two) Times a Day As Needed. Not 24 hours of procedure, Disp: , Rfl:   •  Synthroid 50 MCG tablet, TAKE 1 TABLET DAILY, Disp: 90 tablet, Rfl: 0     in past  Assessment & Plan   Hypothyroidism: She is euthyroid, currently on levothyroxine 50 mcg p.o. daily.  She did try [facility] Thyroid and could not tolerate it.  We will continue current dose of levothyroxine 50 mcg p.o. daily.    Vitamin D deficiency: She takes vitamin D over-the-counter 1000 units p.o. daily.    PCOS: She does have IUD, she does not want to plan for pregnancy.  She could not tolerate Metformin and Spironolactone.    Obesity: She is working on her diet and activity, she is in process to remove lap band because of the hiccups. She has lost 5 lbs since last seen.     Hypertension: Advised to check BP at home 1-2 times per week.     Hyperglycemia: Recommend to check fasting glucose and A1c at next visit.     Assessment and Plan from 11/12/2021 reviewed and update.           Adela Duncan MD FACE.

## 2022-12-23 NOTE — PATIENT INSTRUCTIONS
Continue current medications  Continue to work on your diet and activity  Check blood pressure at home  Follow-up in 1 year with labs.

## 2023-02-21 RX ORDER — LOSARTAN POTASSIUM 25 MG/1
TABLET ORAL
Qty: 90 TABLET | Refills: 3 | Status: SHIPPED | OUTPATIENT
Start: 2023-02-21

## 2023-03-13 RX ORDER — LEVOTHYROXINE SODIUM 50 MCG
TABLET ORAL
Qty: 90 TABLET | Refills: 2 | Status: SHIPPED | OUTPATIENT
Start: 2023-03-13

## 2023-11-20 RX ORDER — LEVOTHYROXINE SODIUM 50 MCG
TABLET ORAL
Qty: 90 TABLET | Refills: 0 | Status: SHIPPED | OUTPATIENT
Start: 2023-11-20

## 2023-11-27 ENCOUNTER — TELEPHONE (OUTPATIENT)
Dept: ENDOCRINOLOGY | Facility: CLINIC | Age: 43
End: 2023-11-27

## 2023-11-27 NOTE — TELEPHONE ENCOUNTER
Hub staff attempted to follow warm transfer process and was unsuccessful     Caller: Tiara Dewitt    Relationship to patient: Self    Best call back number: [phone]    Patient is needing: PATIENT NEEDS CALL BACK TO SCHEDULE APPOINTMENT TO HAVE HER LAB WORK DONE ON 12-8-23. ORDERS ARE ALREADY IN THE CHART AND READY

## 2023-12-08 ENCOUNTER — LAB (OUTPATIENT)
Dept: LAB | Facility: HOSPITAL | Age: 43
End: 2023-12-08
Payer: COMMERCIAL

## 2023-12-08 DIAGNOSIS — E03.8 HYPOTHYROIDISM DUE TO HASHIMOTO'S THYROIDITIS: ICD-10-CM

## 2023-12-08 DIAGNOSIS — E06.3 HYPOTHYROIDISM DUE TO HASHIMOTO'S THYROIDITIS: ICD-10-CM

## 2023-12-08 DIAGNOSIS — E28.2 POLYCYSTIC OVARIAN DISEASE: ICD-10-CM

## 2023-12-08 DIAGNOSIS — E55.9 VITAMIN D DEFICIENCY: ICD-10-CM

## 2023-12-08 DIAGNOSIS — I10 BENIGN HYPERTENSION: ICD-10-CM

## 2023-12-08 LAB
ALBUMIN SERPL-MCNC: 4.4 G/DL (ref 3.5–5.2)
ALBUMIN/GLOB SERPL: 1.6 G/DL
ALP SERPL-CCNC: 51 U/L (ref 39–117)
ALT SERPL W P-5'-P-CCNC: 15 U/L (ref 1–33)
ANION GAP SERPL CALCULATED.3IONS-SCNC: 11 MMOL/L (ref 5–15)
AST SERPL-CCNC: 14 U/L (ref 1–32)
BILIRUB SERPL-MCNC: 1.7 MG/DL (ref 0–1.2)
BUN SERPL-MCNC: 9 MG/DL (ref 6–20)
BUN/CREAT SERPL: 9.1 (ref 7–25)
CALCIUM SPEC-SCNC: 9.6 MG/DL (ref 8.6–10.5)
CHLORIDE SERPL-SCNC: 106 MMOL/L (ref 98–107)
CO2 SERPL-SCNC: 23 MMOL/L (ref 22–29)
CREAT SERPL-MCNC: 0.99 MG/DL (ref 0.57–1)
EGFRCR SERPLBLD CKD-EPI 2021: 72.3 ML/MIN/1.73
GLOBULIN UR ELPH-MCNC: 2.8 GM/DL
GLUCOSE SERPL-MCNC: 92 MG/DL (ref 65–99)
HBA1C MFR BLD: 5.4 % (ref 4.8–5.6)
POTASSIUM SERPL-SCNC: 4.5 MMOL/L (ref 3.5–5.2)
PROT SERPL-MCNC: 7.2 G/DL (ref 6–8.5)
SODIUM SERPL-SCNC: 140 MMOL/L (ref 136–145)
T4 FREE SERPL-MCNC: 1.28 NG/DL (ref 0.93–1.7)
TSH SERPL DL<=0.05 MIU/L-ACNC: 2.63 UIU/ML (ref 0.27–4.2)

## 2023-12-08 PROCEDURE — 36415 COLL VENOUS BLD VENIPUNCTURE: CPT

## 2023-12-08 PROCEDURE — 84443 ASSAY THYROID STIM HORMONE: CPT

## 2023-12-08 PROCEDURE — 84439 ASSAY OF FREE THYROXINE: CPT

## 2023-12-08 PROCEDURE — 83036 HEMOGLOBIN GLYCOSYLATED A1C: CPT

## 2023-12-08 PROCEDURE — 80053 COMPREHEN METABOLIC PANEL: CPT

## 2023-12-15 ENCOUNTER — OFFICE VISIT (OUTPATIENT)
Dept: ENDOCRINOLOGY | Facility: CLINIC | Age: 43
End: 2023-12-15
Payer: COMMERCIAL

## 2023-12-15 VITALS
WEIGHT: 280 LBS | HEART RATE: 81 BPM | OXYGEN SATURATION: 97 % | SYSTOLIC BLOOD PRESSURE: 125 MMHG | BODY MASS INDEX: 47.8 KG/M2 | DIASTOLIC BLOOD PRESSURE: 75 MMHG | HEIGHT: 64 IN

## 2023-12-15 DIAGNOSIS — E55.9 VITAMIN D DEFICIENCY: ICD-10-CM

## 2023-12-15 DIAGNOSIS — E06.3 HYPOTHYROIDISM DUE TO HASHIMOTO'S THYROIDITIS: Primary | ICD-10-CM

## 2023-12-15 DIAGNOSIS — E28.2 POLYCYSTIC OVARIAN DISEASE: ICD-10-CM

## 2023-12-15 DIAGNOSIS — E03.8 HYPOTHYROIDISM DUE TO HASHIMOTO'S THYROIDITIS: Primary | ICD-10-CM

## 2023-12-15 DIAGNOSIS — E66.01 MORBID OBESITY DUE TO EXCESS CALORIES: ICD-10-CM

## 2023-12-15 RX ORDER — LEVOTHYROXINE SODIUM 0.05 MG/1
50 TABLET ORAL DAILY
Qty: 90 TABLET | Refills: 3 | Status: SHIPPED | OUTPATIENT
Start: 2023-12-15

## 2023-12-15 RX ORDER — SEMAGLUTIDE 1 MG/.5ML
INJECTION, SOLUTION SUBCUTANEOUS
COMMUNITY
Start: 2023-11-30

## 2023-12-15 RX ORDER — SEMAGLUTIDE 0.25 MG/.5ML
INJECTION, SOLUTION SUBCUTANEOUS
COMMUNITY
Start: 2023-08-28

## 2023-12-15 NOTE — PROGRESS NOTES
Endocrine Progress Note Outpatient     Patient Care Team:  Kimmy Carter APRN as PCP - General (Nurse Practitioner)      Chief Complaint: Follow-up hypothyroidism:     HPI: 42-year-old female with history of hypothyroidism, PCOS, vitamin D deficiency and obesity is followed through telehealth    For hypothyroidism: She is currently on levothyroxine 50 mcg p.o. daily.      Vitamin D deficiency: She is taking vitamin D 1000 units po daily.      PCOS: She has a stopped Spironolactone, she is a status post lap band, she started having some hiccups and she is in process of removing the LAP-BAND.  He is working on her diet and activity.  She stopped Metformin as she is not able to swallow pills due to gastric banding in past.     Hypertension: Well controlled.     Past Medical History:   Diagnosis Date    Anxiety     Bone spur     Cornea ulcer     Cough 2022    Female hirsutism     H/O Hashimoto thyroiditis     History of kidney stones     Hypertension     IUD (intrauterine device) in place     presence of mirena IUD    Kidney stone 2014    Migraine     few times per year    Morbid obesity     Nasal congestion 2022    Polycystic ovarian syndrome     Pseudogout     Vitamin D deficiency        Social History     Socioeconomic History    Marital status:     Number of children: 2    Years of education: 14   Tobacco Use    Smoking status: Never    Smokeless tobacco: Never   Vaping Use    Vaping Use: Never used   Substance and Sexual Activity    Alcohol use: Yes     Comment: 3 times a year    Drug use: No    Sexual activity: Defer       Family History   Problem Relation Age of Onset    Heart disease Mother     Stroke Mother          of cerebral hemorrhage on Plavix, aspirin, and coumadin)    Hypertension Mother     Heart disease Father     Diabetes Father     Cancer Maternal Grandmother         colon cancer    Heart disease Paternal Grandfather     Cancer Other         aunt - breast cancer       No  Known Allergies    ROS:   Constitutional:  Admit fatigue, tiredness.    Eyes:  Denies change in visual acuity   HENT:  Denies nasal congestion or sore throat   Respiratory: denies cough, shortness of breath.   Cardiovascular:  denies chest pain, edema   GI:  Denies abdominal pain, nausea, vomiting.   Musculoskeletal:  Denies back pain or joint pain   Integument:  Admit dry skin and rash   Neurologic:  Denies headache, focal weakness or sensory changes   Endocrine:  Denies polyuria or polydipsia   Psychiatric:  Denies depression, admit anxiety      Vitals:    12/15/23 1115   BP: 125/75   Pulse: 81   SpO2: 97%       Physical Exam:  GEN: NAD, conversant, Obese  CHEST: CTA  CVS: RRR  PSYCH: AOX3, appropriate mood, affect normal      Results Review:     I reviewed the patient's new clinical results.    Lab Results   Component Value Date    HGBA1C 5.40 12/08/2023    HGBA1C 4.9 08/16/2019      Lab Results   Component Value Date    GLUCOSE 92 12/08/2023    BUN 9 12/08/2023    CREATININE 0.99 12/08/2023    EGFRIFNONA 67 11/05/2021    EGFRIFAFRI 115 01/20/2017    BCR 9.1 12/08/2023    K 4.5 12/08/2023    CO2 23.0 12/08/2023    CALCIUM 9.6 12/08/2023    ALBUMIN 4.4 12/08/2023    LABIL2 1.2 02/08/2019    AST 14 12/08/2023    ALT 15 12/08/2023    TRIG 123 11/18/2021     (H) 11/18/2021    HDL 42 11/18/2021     Lab Results   Component Value Date    TSH 2.630 12/08/2023    FREET4 1.28 12/08/2023     Vitamin D 25 Hydroxy (11/04/2022 08:48)       Medication Review: Reviewed.       Current Outpatient Medications:     Cholecalciferol (VITAMIN D3) 1000 units capsule, Take 1 capsule by mouth Daily., Disp: , Rfl:     losartan (COZAAR) 25 MG tablet, TAKE 1 TABLET DAILY, Disp: 90 tablet, Rfl: 3    NON FORMULARY, INJECT 0.5ML (50 UNITS ON INSULIN SYRINGE) INTO SKIN ONCE WEEKLY, Disp: , Rfl:     Synthroid 50 MCG tablet, TAKE 1 TABLET DAILY, Disp: 90 tablet, Rfl: 0    Wegovy 0.25 MG/0.5ML solution auto-injector, , Disp: , Rfl:      Wegovy 1 MG/0.5ML solution auto-injector, , Disp: , Rfl:       Assessment & Plan   Hypothyroidism: She is euthyroid, currently on levothyroxine 50 mcg p.o. daily.     Vitamin D deficiency: She takes vitamin D over-the-counter 1000 units p.o. daily.    PCOS: She does have IUD, she does not want to plan for pregnancy.  She could not tolerate Metformin and Spironolactone.    Obesity: She is on Wegovy Rx through PCP.     Hypertension: Advised to check BP at home 1-2 times per week.     Hyperglycemia: A1c normal.     Assessment and plan from December 23, 2022 reviewed and updated          Adela Duncan MD FACE.

## 2023-12-15 NOTE — PATIENT INSTRUCTIONS
Continue current management  Continue to work on your diet and activity  Follow-up in 1 year with labs.

## 2023-12-26 ENCOUNTER — TELEMEDICINE (OUTPATIENT)
Dept: FAMILY MEDICINE CLINIC | Facility: TELEHEALTH | Age: 43
End: 2023-12-26
Payer: COMMERCIAL

## 2023-12-26 VITALS — HEART RATE: 72 BPM | TEMPERATURE: 98.7 F

## 2023-12-26 DIAGNOSIS — H65.03 BILATERAL ACUTE SEROUS OTITIS MEDIA, RECURRENCE NOT SPECIFIED: ICD-10-CM

## 2023-12-26 DIAGNOSIS — J02.9 SORE THROAT: Primary | ICD-10-CM

## 2023-12-26 RX ORDER — PREDNISONE 20 MG/1
TABLET ORAL
Qty: 10 TABLET | Refills: 0 | Status: SHIPPED | OUTPATIENT
Start: 2023-12-26

## 2023-12-26 NOTE — PROGRESS NOTES
You have chosen to receive care through a telehealth visit.  Do you consent to use a video/audio connection for your medical care today? Yes     HPI  Tiara Dewitt is a 43 y.o. female  presents with complaint of sore throat and right ear pain for 2 days. No fever. Right sided maxillary sinus pain. Taking Tylenol for pain. Patient was suppose to start an antibiotic today for a cut on her finger but never started.     Review of Systems   Constitutional: Negative.    HENT:  Positive for congestion, ear pain (bilaterally) and sore throat.    Respiratory: Negative.     Cardiovascular: Negative.    Gastrointestinal: Negative.    Musculoskeletal: Negative.    Neurological: Negative.    Hematological: Negative.    Psychiatric/Behavioral: Negative.         Past Medical History:   Diagnosis Date    Anxiety     Bone spur     Cornea ulcer     Cough 2022    Female hirsutism     H/O Hashimoto thyroiditis     History of kidney stones     Hypertension     IUD (intrauterine device) in place     presence of mirena IUD    Kidney stone 2014    Migraine     few times per year    Morbid obesity     Nasal congestion 2022    Polycystic ovarian syndrome     Pseudogout     Vitamin D deficiency        Family History   Problem Relation Age of Onset    Heart disease Mother     Stroke Mother          of cerebral hemorrhage on Plavix, aspirin, and coumadin)    Hypertension Mother     Heart disease Father     Diabetes Father     Cancer Maternal Grandmother         colon cancer    Heart disease Paternal Grandfather     Cancer Other         aunt - breast cancer       Social History     Socioeconomic History    Marital status:     Number of children: 2    Years of education: 14   Tobacco Use    Smoking status: Never    Smokeless tobacco: Never   Vaping Use    Vaping Use: Never used   Substance and Sexual Activity    Alcohol use: Yes     Comment: 3 times a year    Drug use: No    Sexual activity: Defer         Pulse 72    Temp 98.7 °F (37.1 °C)     PHYSICAL EXAM  Physical Exam   Constitutional: She is oriented to person, place, and time. She appears well-developed and well-nourished. She does not have a sickly appearance. She does not appear ill. No distress.   HENT:   Head: Normocephalic and atraumatic.   Right Ear: Hearing normal.   Left Ear: Hearing normal.   Nose: Nose normal.   Mouth/Throat: Mouth/Lips are normal.  TM's dull with no LM or LR. Right TM mildly with red area and ear canal redness. Oral pharynx erythremic.    Pulmonary/Chest: Effort normal.   Neurological: She is alert and oriented to person, place, and time.   Psychiatric: She has a normal mood and affect.   Vitals reviewed.      Diagnoses and all orders for this visit:    1. Sore throat (Primary)  -     POC Strep A, PCR ([facility]); Future    2. Bilateral acute serous otitis media, recurrence not specified  -     predniSONE (DELTASONE) 20 MG tablet; Take 2 tabs po nicole with breakfast for 5 days.  Dispense: 10 tablet; Refill: 0    Warm salt water gargles and rest.   Start Moxifloxacin  as directed for cut on finger.   Start prednisone and take with food.   Tylenol as directed.       FOLLOW-UP  As discussed during visit with [facility] ([facility]), if symptoms worsen or fail to improve, follow-up with PCP/Urgent Care/Emergency Department.    Patient verbalizes understanding of medications, instructions for treatment and follow-up.    Ellen Larsen, RHONDA  12/26/2023  12:29 EST    The use of a video visit has been reviewed with the patient and verbal informed consent has been obtained. Myself and Tiara Dewitt participated in this visit. The patient is located in [facility], and I am located in [place], [place]. MyChart and Tyto  were utilized.

## 2024-01-14 PROCEDURE — 87591 N.GONORRHOEAE DNA AMP PROB: CPT | Performed by: NURSE PRACTITIONER

## 2024-01-14 PROCEDURE — 87491 CHLMYD TRACH DNA AMP PROBE: CPT | Performed by: NURSE PRACTITIONER

## 2024-01-14 PROCEDURE — 87661 TRICHOMONAS VAGINALIS AMPLIF: CPT | Performed by: NURSE PRACTITIONER

## 2024-02-16 RX ORDER — LOSARTAN POTASSIUM 25 MG/1
TABLET ORAL
Qty: 90 TABLET | Refills: 3 | Status: SHIPPED | OUTPATIENT
Start: 2024-02-16

## 2024-12-06 ENCOUNTER — LAB (OUTPATIENT)
Dept: LAB | Facility: HOSPITAL | Age: 44
End: 2024-12-06
Payer: COMMERCIAL

## 2024-12-06 DIAGNOSIS — E55.9 VITAMIN D DEFICIENCY: ICD-10-CM

## 2024-12-06 DIAGNOSIS — E28.2 POLYCYSTIC OVARIAN DISEASE: ICD-10-CM

## 2024-12-06 DIAGNOSIS — E06.3 HYPOTHYROIDISM DUE TO HASHIMOTO'S THYROIDITIS: ICD-10-CM

## 2024-12-06 DIAGNOSIS — E66.01 MORBID OBESITY DUE TO EXCESS CALORIES: ICD-10-CM

## 2024-12-06 LAB
ALBUMIN SERPL-MCNC: 4.3 G/DL (ref 3.5–5.2)
ALBUMIN/GLOB SERPL: 1.4 G/DL
ALP SERPL-CCNC: 62 U/L (ref 39–117)
ALT SERPL W P-5'-P-CCNC: 11 U/L (ref 1–33)
ANION GAP SERPL CALCULATED.3IONS-SCNC: 7.6 MMOL/L (ref 5–15)
AST SERPL-CCNC: 15 U/L (ref 1–32)
BILIRUB SERPL-MCNC: 1.4 MG/DL (ref 0–1.2)
BUN SERPL-MCNC: 9 MG/DL (ref 6–20)
BUN/CREAT SERPL: 10.8 (ref 7–25)
CALCIUM SPEC-SCNC: 9.5 MG/DL (ref 8.6–10.5)
CHLORIDE SERPL-SCNC: 103 MMOL/L (ref 98–107)
CO2 SERPL-SCNC: 27.4 MMOL/L (ref 22–29)
CREAT SERPL-MCNC: 0.83 MG/DL (ref 0.57–1)
EGFRCR SERPLBLD CKD-EPI 2021: 88.7 ML/MIN/1.73
GLOBULIN UR ELPH-MCNC: 3 GM/DL
GLUCOSE SERPL-MCNC: 85 MG/DL (ref 65–99)
HBA1C MFR BLD: 5.5 % (ref 4.8–5.6)
POTASSIUM SERPL-SCNC: 4.2 MMOL/L (ref 3.5–5.2)
PROT SERPL-MCNC: 7.3 G/DL (ref 6–8.5)
SODIUM SERPL-SCNC: 138 MMOL/L (ref 136–145)
T4 FREE SERPL-MCNC: 1.16 NG/DL (ref 0.93–1.7)
TSH SERPL DL<=0.05 MIU/L-ACNC: 2.96 UIU/ML (ref 0.27–4.2)

## 2024-12-06 PROCEDURE — 84443 ASSAY THYROID STIM HORMONE: CPT

## 2024-12-06 PROCEDURE — 84439 ASSAY OF FREE THYROXINE: CPT

## 2024-12-06 PROCEDURE — 80053 COMPREHEN METABOLIC PANEL: CPT

## 2024-12-06 PROCEDURE — 83036 HEMOGLOBIN GLYCOSYLATED A1C: CPT

## 2024-12-06 PROCEDURE — 36415 COLL VENOUS BLD VENIPUNCTURE: CPT

## 2024-12-13 ENCOUNTER — OFFICE VISIT (OUTPATIENT)
Dept: ENDOCRINOLOGY | Facility: CLINIC | Age: 44
End: 2024-12-13
Payer: COMMERCIAL

## 2024-12-13 VITALS
HEIGHT: 64 IN | OXYGEN SATURATION: 100 % | BODY MASS INDEX: 48.65 KG/M2 | SYSTOLIC BLOOD PRESSURE: 125 MMHG | DIASTOLIC BLOOD PRESSURE: 75 MMHG | HEART RATE: 80 BPM | WEIGHT: 285 LBS

## 2024-12-13 DIAGNOSIS — E66.813 CLASS 3 SEVERE OBESITY DUE TO EXCESS CALORIES WITHOUT SERIOUS COMORBIDITY WITH BODY MASS INDEX (BMI) OF 45.0 TO 49.9 IN ADULT: ICD-10-CM

## 2024-12-13 DIAGNOSIS — E28.2 POLYCYSTIC OVARIAN DISEASE: ICD-10-CM

## 2024-12-13 DIAGNOSIS — E06.3 HYPOTHYROIDISM DUE TO HASHIMOTO'S THYROIDITIS: Primary | ICD-10-CM

## 2024-12-13 DIAGNOSIS — E66.01 CLASS 3 SEVERE OBESITY DUE TO EXCESS CALORIES WITHOUT SERIOUS COMORBIDITY WITH BODY MASS INDEX (BMI) OF 45.0 TO 49.9 IN ADULT: ICD-10-CM

## 2024-12-13 DIAGNOSIS — E55.9 VITAMIN D DEFICIENCY: ICD-10-CM

## 2024-12-13 DIAGNOSIS — I10 BENIGN HYPERTENSION: ICD-10-CM

## 2024-12-13 PROCEDURE — 99214 OFFICE O/P EST MOD 30 MIN: CPT | Performed by: INTERNAL MEDICINE

## 2024-12-13 RX ORDER — LOSARTAN POTASSIUM 25 MG/1
25 TABLET ORAL DAILY
Qty: 90 TABLET | Refills: 3 | Status: SHIPPED | OUTPATIENT
Start: 2024-12-13 | End: 2024-12-13 | Stop reason: SDUPTHER

## 2024-12-13 RX ORDER — LEVOTHYROXINE SODIUM 50 UG/1
50 TABLET ORAL DAILY
Qty: 90 TABLET | Refills: 3 | Status: SHIPPED | OUTPATIENT
Start: 2024-12-13 | End: 2024-12-13 | Stop reason: SDUPTHER

## 2024-12-13 NOTE — PROGRESS NOTES
Endocrine Progress Note Outpatient     Patient Care Team:  Violetta Rosales APRN as PCP - General (Internal Medicine)    Chief Complaint: Follow-up hypothyroidism:     HPI: 44-year-old female with history of hypothyroidism, PCOS, vitamin D deficiency and obesity is followed through telehealth    For hypothyroidism: She is currently on levothyroxine 50 mcg p.o. daily.      Vitamin D deficiency: Apparently she saw a new primary care physician and since her vitamin D level was normal they asked her to stop vitamin D which was in 2024.    PCOS: She has a stopped Spironolactone, she is a status post lap band, she started having some hiccups and she is in process of removing the LAP-BAND.  He is working on her diet and activity.  She stopped Metformin as she is not able to swallow pills due to gastric banding in past.     Hypertension: Well controlled.     Past Medical History:   Diagnosis Date    Anxiety     Bone spur     Cornea ulcer     Cough 2022    Female hirsutism     H/O Hashimoto thyroiditis     History of kidney stones     Hypertension     IUD (intrauterine device) in place     presence of mirena IUD    Kidney stone 2014    Migraine     few times per year    Morbid obesity     Nasal congestion 2022    Polycystic ovarian syndrome     Pseudogout     Vitamin D deficiency        Social History     Socioeconomic History    Marital status:     Number of children: 2    Years of education: 14   Tobacco Use    Smoking status: Never     Passive exposure: Never    Smokeless tobacco: Never   Vaping Use    Vaping status: Never Used   Substance and Sexual Activity    Alcohol use: Yes     Comment: 3 times a year    Drug use: No    Sexual activity: Defer       Family History   Problem Relation Age of Onset    Heart disease Mother     Stroke Mother          of cerebral hemorrhage on Plavix, aspirin, and coumadin)    Hypertension Mother     Heart disease Father     Diabetes Father     Cancer  Maternal Grandmother         colon cancer    Heart disease Paternal Grandfather     Cancer Other         aunt - breast cancer       No Known Allergies    ROS:   Constitutional:  Admit fatigue, tiredness.    Eyes:  Denies change in visual acuity   HENT:  Denies nasal congestion or sore throat   Respiratory: denies cough, shortness of breath.   Cardiovascular:  denies chest pain, edema   GI:  Denies abdominal pain, nausea, vomiting.   Musculoskeletal:  Denies back pain or joint pain   Integument:  Admit dry skin and rash   Neurologic:  Denies headache, focal weakness or sensory changes   Endocrine:  Denies polyuria or polydipsia   Psychiatric:  Denies depression, admit anxiety      Vitals:    12/13/24 0753   BP: 125/75   Pulse: 80   SpO2: 100%       Physical Exam:  GEN: NAD, conversant, Obese  CHEST: CTA  CVS: RRR  PSYCH: AOX3, appropriate mood, affect normal      Results Review:     I reviewed the patient's new clinical results.    Lab Results   Component Value Date    HGBA1C 5.50 12/06/2024    HGBA1C 5.40 12/08/2023    HGBA1C 4.9 08/16/2019      Lab Results   Component Value Date    GLUCOSE 85 12/06/2024    BUN 9 12/06/2024    CREATININE 0.83 12/06/2024    EGFRIFNONA 67 11/05/2021    EGFRIFAFRI 115 01/20/2017    BCR 10.8 12/06/2024    K 4.2 12/06/2024    CO2 27.4 12/06/2024    CALCIUM 9.5 12/06/2024    ALBUMIN 4.3 12/06/2024    LABIL2 1.2 02/08/2019    AST 15 12/06/2024    ALT 11 12/06/2024    TRIG 123 11/18/2021     (H) 11/18/2021    HDL 42 11/18/2021     Lab Results   Component Value Date    TSH 2.960 12/06/2024    FREET4 1.16 12/06/2024     Vitamin D 25 Hydroxy (11/04/2022 08:48)       Medication Review: Reviewed.       Current Outpatient Medications:     levothyroxine (Synthroid) 50 MCG tablet, Take 1 tablet by mouth Daily., Disp: 90 tablet, Rfl: 3    losartan (COZAAR) 25 MG tablet, TAKE 1 TABLET DAILY, Disp: 90 tablet, Rfl: 3      Assessment & Plan   Hypothyroidism: Well-controlled, biochemically she is  euthyroid.  Will continue levothyroxine at 50 mcg p.o. daily.    Vitamin D deficiency: Her vitamin D level about 5 years ago was low at 18, she was on vitamin D supplementation and therefore her vitamin D levels were normal.  She was advised in September to stop vitamin D by her primary care, have asked her to resume her vitamin D supplementation.    PCOS: She does have IUD, she does not want to plan for pregnancy.  She could not tolerate Metformin and Spironolactone.    Class 3 Obesity: She is on Wegovy Rx through PCP.     Hypertension: Well-controlled, continue losartan.    Hyperglycemia: A1c normal.     Assessment and plan from December 15, 2023 reviewed and updated.          Adela Duncan MD FACE.

## 2024-12-13 NOTE — PATIENT INSTRUCTIONS
Continue levothyroxine at 50 mcg p.o. daily  Continue losartan at 25 milligram p.o. daily  Please continue to work on your diet and activity and try to lose weight  Follow-up in 1 year with labs.

## 2024-12-15 RX ORDER — LEVOTHYROXINE SODIUM 50 UG/1
50 TABLET ORAL DAILY
Qty: 90 TABLET | Refills: 3 | Status: SHIPPED | OUTPATIENT
Start: 2024-12-15

## 2024-12-15 RX ORDER — LOSARTAN POTASSIUM 25 MG/1
25 TABLET ORAL DAILY
Qty: 90 TABLET | Refills: 3 | Status: SHIPPED | OUTPATIENT
Start: 2024-12-15